# Patient Record
Sex: FEMALE | Race: BLACK OR AFRICAN AMERICAN | NOT HISPANIC OR LATINO | Employment: FULL TIME | URBAN - METROPOLITAN AREA
[De-identification: names, ages, dates, MRNs, and addresses within clinical notes are randomized per-mention and may not be internally consistent; named-entity substitution may affect disease eponyms.]

---

## 2018-11-27 ENCOUNTER — HOSPITAL ENCOUNTER (EMERGENCY)
Facility: CLINIC | Age: 38
Discharge: HOME | End: 2018-11-27
Attending: EMERGENCY MEDICINE | Admitting: EMERGENCY MEDICINE
Payer: COMMERCIAL

## 2018-11-27 VITALS
HEIGHT: 62 IN | DIASTOLIC BLOOD PRESSURE: 70 MMHG | BODY MASS INDEX: 34.96 KG/M2 | OXYGEN SATURATION: 100 % | WEIGHT: 190 LBS | SYSTOLIC BLOOD PRESSURE: 120 MMHG | RESPIRATION RATE: 16 BRPM | TEMPERATURE: 98 F

## 2018-11-27 DIAGNOSIS — K29.00 ACUTE GASTRITIS WITHOUT HEMORRHAGE, UNSPECIFIED GASTRITIS TYPE: ICD-10-CM

## 2018-11-27 DIAGNOSIS — R10.13 ABDOMINAL PAIN, EPIGASTRIC: ICD-10-CM

## 2018-11-27 LAB
ALBUMIN SERPL-MCNC: 3.5 G/DL (ref 3.4–5)
ALP SERPL-CCNC: 50 U/L (ref 40–150)
ALT SERPL W P-5'-P-CCNC: 17 U/L (ref 0–50)
ANION GAP SERPL CALCULATED.3IONS-SCNC: 6 MMOL/L (ref 3–14)
AST SERPL W P-5'-P-CCNC: 11 U/L (ref 0–45)
BASOPHILS # BLD AUTO: 0 10E9/L (ref 0–0.2)
BASOPHILS NFR BLD AUTO: 0.5 %
BILIRUB SERPL-MCNC: 0.6 MG/DL (ref 0.2–1.3)
BUN SERPL-MCNC: 20 MG/DL (ref 7–30)
CALCIUM SERPL-MCNC: 8.2 MG/DL (ref 8.5–10.1)
CHLORIDE SERPL-SCNC: 107 MMOL/L (ref 94–109)
CO2 SERPL-SCNC: 26 MMOL/L (ref 20–32)
CREAT SERPL-MCNC: 0.81 MG/DL (ref 0.52–1.04)
DIFFERENTIAL METHOD BLD: NORMAL
EOSINOPHIL # BLD AUTO: 0.4 10E9/L (ref 0–0.7)
EOSINOPHIL NFR BLD AUTO: 6.5 %
ERYTHROCYTE [DISTWIDTH] IN BLOOD BY AUTOMATED COUNT: 12.8 % (ref 10–15)
GFR SERPL CREATININE-BSD FRML MDRD: 79 ML/MIN/1.7M2
GLUCOSE SERPL-MCNC: 87 MG/DL (ref 70–99)
HCG UR QL: NEGATIVE
HCT VFR BLD AUTO: 38.2 % (ref 35–47)
HGB BLD-MCNC: 12.8 G/DL (ref 11.7–15.7)
IMM GRANULOCYTES # BLD: 0 10E9/L (ref 0–0.4)
IMM GRANULOCYTES NFR BLD: 0.2 %
LIPASE SERPL-CCNC: 88 U/L (ref 73–393)
LYMPHOCYTES # BLD AUTO: 2 10E9/L (ref 0.8–5.3)
LYMPHOCYTES NFR BLD AUTO: 31.5 %
MCH RBC QN AUTO: 31.4 PG (ref 26.5–33)
MCHC RBC AUTO-ENTMCNC: 33.5 G/DL (ref 31.5–36.5)
MCV RBC AUTO: 94 FL (ref 78–100)
MONOCYTES # BLD AUTO: 0.7 10E9/L (ref 0–1.3)
MONOCYTES NFR BLD AUTO: 10.9 %
NEUTROPHILS # BLD AUTO: 3.2 10E9/L (ref 1.6–8.3)
NEUTROPHILS NFR BLD AUTO: 50.4 %
NRBC # BLD AUTO: 0 10*3/UL
NRBC BLD AUTO-RTO: 0 /100
PLATELET # BLD AUTO: 226 10E9/L (ref 150–450)
POTASSIUM SERPL-SCNC: 3.6 MMOL/L (ref 3.4–5.3)
PROT SERPL-MCNC: 7.4 G/DL (ref 6.8–8.8)
RBC # BLD AUTO: 4.07 10E12/L (ref 3.8–5.2)
SODIUM SERPL-SCNC: 139 MMOL/L (ref 133–144)
WBC # BLD AUTO: 6.3 10E9/L (ref 4–11)

## 2018-11-27 PROCEDURE — 25000125 ZZHC RX 250: Performed by: EMERGENCY MEDICINE

## 2018-11-27 PROCEDURE — 80053 COMPREHEN METABOLIC PANEL: CPT | Performed by: EMERGENCY MEDICINE

## 2018-11-27 PROCEDURE — 85025 COMPLETE CBC W/AUTO DIFF WBC: CPT | Performed by: EMERGENCY MEDICINE

## 2018-11-27 PROCEDURE — 81025 URINE PREGNANCY TEST: CPT | Performed by: EMERGENCY MEDICINE

## 2018-11-27 PROCEDURE — 83690 ASSAY OF LIPASE: CPT | Performed by: EMERGENCY MEDICINE

## 2018-11-27 PROCEDURE — 99283 EMERGENCY DEPT VISIT LOW MDM: CPT

## 2018-11-27 PROCEDURE — 25000128 H RX IP 250 OP 636: Performed by: EMERGENCY MEDICINE

## 2018-11-27 PROCEDURE — 25000132 ZZH RX MED GY IP 250 OP 250 PS 637: Performed by: EMERGENCY MEDICINE

## 2018-11-27 RX ORDER — FAMOTIDINE 40 MG/1
40 TABLET, FILM COATED ORAL AT BEDTIME
Qty: 30 TABLET | Refills: 1 | Status: SHIPPED | OUTPATIENT
Start: 2018-11-27

## 2018-11-27 RX ADMIN — SODIUM CHLORIDE 1000 ML: 9 INJECTION, SOLUTION INTRAVENOUS at 06:25

## 2018-11-27 RX ADMIN — LIDOCAINE HYDROCHLORIDE 30 ML: 20 SOLUTION ORAL; TOPICAL at 06:25

## 2018-11-27 ASSESSMENT — ENCOUNTER SYMPTOMS
LIGHT-HEADEDNESS: 1
HEMATURIA: 0
NAUSEA: 1
VOMITING: 0
ABDOMINAL PAIN: 1

## 2018-11-27 NOTE — ED AVS SNAPSHOT
Emergency Department    [address] [place] [address]    Phone:  [phone]    Fax:  [phone]                                       Radha Loya   MRN: [id number]    Department:   Emergency Department   Date of Visit:  11/27/2018           After Visit Summary Signature Page     I have received my discharge instructions, and my questions have been answered. I have discussed any challenges I see with this plan with the nurse or doctor.    ..........................................................................................................................................  Patient/Patient Representative Signature      ..........................................................................................................................................  Patient Representative Print Name and Relationship to Patient    ..................................................               ................................................  Date                                   Time    ..........................................................................................................................................  Reviewed by Signature/Title    ...................................................              ..............................................  Date                                               Time          22EPIC Rev 08/18

## 2018-11-27 NOTE — ED AVS SNAPSHOT
Emergency Department    [address] [facility] [address]    Phone:  [phone]    Fax:  [phone]                                       Radha Loya   MRN: [id number]    Department:   Emergency Department   Date of Visit:  11/27/2018           Patient Information     Date Of Birth          1980        Your diagnoses for this visit were:     Abdominal pain, epigastric     Acute gastritis without hemorrhage, unspecified gastritis type        You were seen by Rigo Green MD.      Follow-up Information     Follow up with Clinic, [facility]. Schedule an appointment as soon as possible for a visit in 3 days.    Why:  For repeat evaluation and symptom check    Contact information:    [address] [facility] [address] [phone]          Follow up with  Emergency Department.    Specialty:  EMERGENCY MEDICINE    Why:  If symptoms worsen    Contact information:    [address] [facility] [address] [phone]      Discharge References/Attachments     GASTRITIS OR ULCER (NO ANTIBIOTIC TREATMENT) (ENGLISH)    EPIGASTRIC PAIN (UNCERTAIN CAUSE) (ENGLISH)      24 Hour Appointment Hotline       To make an appointment at any [facility], call [phone] ([phone]). If you don't have a family doctor or clinic, we will help you find one. [facility] clinics are conveniently located to serve the needs of you and your family.             Review of your medicines      START taking        Dose / Directions Last dose taken    famotidine 40 MG tablet   Commonly known as:  PEPCID   Dose:  40 mg   Quantity:  30 tablet        Take 1 tablet (40 mg) by mouth At Bedtime   Refills:  1          Our records show that you are taking the medicines listed below. If these are incorrect, please call your family doctor or clinic.        Dose / Directions Last dose taken    DOXY CAPS 100 MG OR   Quantity:  58        1tab daily  starting 1-2 days before travel ta [facility] area, each day while there  and up to 4 weeks [facility].   Refills:  0        SEPTRA -160 MG per tablet   Quantity:  30   Generic drug:  sulfamethoxazole-trimethoprim        1 tab PO BID prn travelers diarrhea   Refills:  0                Prescriptions were sent or printed at these locations (1 Prescription)                   Other Prescriptions                Printed at Department/Unit printer (1 of 1)         famotidine (PEPCID) 40 MG tablet                Procedures and tests performed during your visit     CBC with platelets differential    Comprehensive metabolic panel    HCG qualitative urine    Lipase      Orders Needing Specimen Collection     None      Pending Results     No orders found from 11/25/2018 to 11/28/2018.            Pending Culture Results     No orders found from 11/25/2018 to 11/28/2018.            Pending Results Instructions     If you had any lab results that were not finalized at the time of your Discharge, you can call the ED Lab Result RN at [phone]. You will be contacted by this team for any positive Lab results or changes in treatment. The nurses are available 7 days a week from 10A to 6:30P.  You can leave a message 24 hours per day and they will return your call.        Test Results From Your Hospital Stay        11/27/2018  6:24 AM      Component Results     Component Value Ref Range & Units Status    WBC 6.3 4.0 - 11.0 10e9/L Final    RBC Count 4.07 3.8 - 5.2 10e12/L Final    Hemoglobin 12.8 11.7 - 15.7 g/dL Final    Hematocrit 38.2 35.0 - 47.0 % Final    MCV 94 78 - 100 fl Final    MCH 31.4 26.5 - 33.0 pg Final    MCHC 33.5 31.5 - 36.5 g/dL Final    RDW 12.8 10.0 - 15.0 % Final    Platelet Count 226 150 - 450 10e9/L Final    Diff Method Automated Method  Final    % Neutrophils 50.4 % Final    % Lymphocytes 31.5 % Final    % Monocytes 10.9 % Final    % Eosinophils 6.5 % Final    % Basophils 0.5 % Final     % Immature Granulocytes 0.2 % Final    Nucleated RBCs 0 0 /100 Final    Absolute Neutrophil 3.2 1.6 - 8.3 10e9/L Final    Absolute Lymphocytes 2.0 0.8 - 5.3 10e9/L Final    Absolute Monocytes 0.7 0.0 - 1.3 10e9/L Final    Absolute Eosinophils 0.4 0.0 - 0.7 10e9/L Final    Absolute Basophils 0.0 0.0 - 0.2 10e9/L Final    Abs Immature Granulocytes 0.0 0 - 0.4 10e9/L Final    Absolute Nucleated RBC 0.0  Final         11/27/2018  6:40 AM      Component Results     Component Value Ref Range & Units Status    Sodium 139 133 - 144 mmol/L Final    Potassium 3.6 3.4 - 5.3 mmol/L Final    Chloride 107 94 - 109 mmol/L Final    Carbon Dioxide 26 20 - 32 mmol/L Final    Anion Gap 6 3 - 14 mmol/L Final    Glucose 87 70 - 99 mg/dL Final    Urea Nitrogen 20 7 - 30 mg/dL Final    Creatinine 0.81 0.52 - 1.04 mg/dL Final    GFR Estimate 79 >60 mL/min/1.7m2 Final    Non  GFR Calc    GFR Estimate If Black >90 >60 mL/min/1.7m2 Final    African American GFR Calc    Calcium 8.2 (L) 8.5 - 10.1 mg/dL Final    Bilirubin Total 0.6 0.2 - 1.3 mg/dL Final    Albumin 3.5 3.4 - 5.0 g/dL Final    Protein Total 7.4 6.8 - 8.8 g/dL Final    Alkaline Phosphatase 50 40 - 150 U/L Final    ALT 17 0 - 50 U/L Final    AST 11 0 - 45 U/L Final         11/27/2018  6:38 AM      Component Results     Component Value Ref Range & Units Status    Lipase 88 73 - 393 U/L Final         11/27/2018  7:10 AM      Component Results     Component Value Ref Range & Units Status    HCG Qual Urine Negative NEG^Negative Final    This test is for screening purposes.  Results should be interpreted along with   the clinical picture.  Confirmation testing is available if warranted by   ordering SFX855, HCG Quantitative Pregnancy.                  Clinical Quality Measure: Blood Pressure Screening     Your blood pressure was checked while you were in the emergency department today. The last reading we obtained was  BP: 119/75 . Please read the guidelines below about  "what these numbers mean and what you should do about them.  If your systolic blood pressure (the top number) is less than 120 and your diastolic blood pressure (the bottom number) is less than 80, then your blood pressure is normal. There is nothing more that you need to do about it.  If your systolic blood pressure (the top number) is 120-139 or your diastolic blood pressure (the bottom number) is 80-89, your blood pressure may be higher than it should be. You should have your blood pressure rechecked within a year by a primary care provider.  If your systolic blood pressure (the top number) is 140 or greater or your diastolic blood pressure (the bottom number) is 90 or greater, you may have high blood pressure. High blood pressure is treatable, but if left untreated over time it can put you at risk for heart attack, stroke, or kidney failure. You should have your blood pressure rechecked by a primary care provider within the next 4 weeks.  If your provider in the emergency department today gave you specific instructions to follow-up with your doctor or provider even sooner than that, you should follow that instruction and not wait for up to 4 weeks for your follow-up visit.        Thank you for choosing [facility]       Thank you for choosing [facility] for your care. Our goal is always to provide you with excellent care. Hearing back from our patients is one way we can continue to improve our services. Please take a few minutes to complete the written survey that you may receive in the mail after you visit with us. Thank you!        [facility] Information     [facility] lets you send messages to your doctor, view your test results, renew your prescriptions, schedule appointments and more. To sign up, go to [email or web address] . Click on \"Log in\" on the left side of the screen, which will take you to the Welcome page. Then click on \"Sign up Now\" on the right side of the page.     You will be asked to enter the access " code listed below, as well as some personal information. Please follow the directions to create your username and password.     Your access code is: [email or web address]  Expires: 2019  7:18 AM     Your access code will  in 90 days. If you need help or a new code, please call your [facility] clinic or [phone].        Care EveryWhere ID     This is your Care EveryWhere ID. This could be used by other organizations to access your [facility] medical records  [id number]        Equal Access to Services     [facility]: Elizabeth Aparicio, geovany gray, nasra reddyaldariusz fitch, vladimir bowles . So [facility] [phone].    ATENCIÓN: Si habla español, tiene a cordoba disposición servicios gratuitos de asistencia lingüística. Llame al [phone].    We comply with applicable federal civil rights laws and [place] laws. We do not discriminate on the basis of race, color, national origin, age, disability, sex, sexual orientation, or gender identity.            After Visit Summary       This is your record. Keep this with you and show to your community pharmacist(s) and doctor(s) at your next visit.

## 2018-11-27 NOTE — ED PROVIDER NOTES
"  History     Chief Complaint:    Abdominal Pain      HPI   Radha Loya is a 38 year old female who presents to the ED for evaluation of abdominal pain. The patient states that she developed sharp, generalized abdominal pain, worse on her left side, last night around 1900 in conjunction with nausea and lightheadedness. She states that she has had similar symptoms before concerning for possible ulcers or kidney stones but the etiology of her symptoms were never identified. She otherwise denies any vomit, hematuria, pain with bowel movements, vaginal bleeding, or vaginal discharge.      Allergies:  Chloroquine     Medications:    The patient is currently on no regular medications.     Past Medical History:    Headaches    Past Surgical History:    C/S  D&C    Family History:    No past pertinent family history.    Social History:  Negative for tobacco use.  Negative for alcohol use.  Marital Status:   [2]       Review of Systems   Gastrointestinal: Positive for abdominal pain and nausea. Negative for vomiting.   Genitourinary: Negative for hematuria, vaginal bleeding and vaginal discharge.   Neurological: Positive for light-headedness.   All other systems reviewed and are negative.      Physical Exam   First Vitals:  BP: 119/75  Heart Rate: 72  Temp: 98  F (36.7  C)  Resp: 18  Height: 157.5 cm (5' 2\")  Weight: 86.2 kg (190 lb)  SpO2: 100 %      Physical Exam   Vitals: reviewed by me  General: Pt seen on [facility], [facility], cooperative, and alert to conversation  Eyes: Tracking well, clear conjunctiva BL  ENT: MMM, midline trachea.   Lungs:  No tachypnea, no accessory muscle use. No respiratory distress.   CV: Rate as above, regular rhythm.    Abd: Soft, minimal tenderness to the epigastrium only, no guarding, no rebound, no pain to McBurney site, negative Vogt sign.  MSK: no peripheral edema or joint effusion.  No evidence of trauma  Skin: No rash, normal turgor and temperature  Neuro: Clear speech and " no facial droop.  Psych: Not RIS, no e/o AH/VH      Emergency Department Course       Laboratory:  CBC: WBC: 6.3, HGB: 12.8, PLT: 226  CMP: Glucose 87, Calcium 8.2 (L), o/w WNL (Creatinine: 0.81)    Lipase: 88    Interventions:  0625 NS 1,000 mLs IV   GI cocktail 30 mLs PO  Please see MAR for full list of medications administered in the ED.      Emergency Department Course:  Nursing notes and vitals reviewed. (7073) I performed an exam of the patient as documented above.     IV inserted. Medicine administered as documented above. Blood drawn. This was sent to the lab for further testing, results above.       I personally reviewed the laboratory results with the Patient and answered all related questions prior to discharge.     Impression & Plan      Medical Decision Making:  A very pleasant 38-year-old female who presents the emergency room with appears to be gastritis versus undifferentiated epigastric pain.  There is a postprandial element, and she feels much improved after a GI cocktail.  Her abdomen is benign at time of arrival and at time of discharge.  Her labs are within normal limits with no evidence of systemic illness, and her vital signs are reassuring as well.  Patient tolerated orals here, I see no evidence of emergent abdominal malady, she has no cardia pulmonary symptoms, I do feel comfortable having her follow in the outpatient setting.  Patient is okay with this plan, knows red flags when to come back.  No vaginal complaints, no lower abdominal pain, otherwise doing well.    Diagnosis:    ICD-10-CM    1. Abdominal pain, epigastric R10.13    2. Acute gastritis without hemorrhage, unspecified gastritis type K29.00        Disposition:  Discharged home     Discharge Medications:  Discharge Medication List as of 11/27/2018  7:18 AM      START taking these medications    Details   famotidine (PEPCID) 40 MG tablet Take 1 tablet (40 mg) by mouth At Bedtime, Disp-30 tablet, R-1, Local Print           Scribe  Disclosure:  I,  Alfonso Gonzales, am serving as a scribe on 11/27/2018 at 5:34 AM to personally document services performed by Riog Green* based on my observations and the provider's statements to me.          Alfonso Gonzales  11/27/2018    EMERGENCY DEPARTMENT       Rigo Green MD  11/28/18 3845

## 2020-02-06 ENCOUNTER — HOSPITAL ENCOUNTER (EMERGENCY)
Facility: CLINIC | Age: 40
Discharge: HOME | End: 2020-02-06
Attending: EMERGENCY MEDICINE | Admitting: EMERGENCY MEDICINE

## 2020-02-06 ENCOUNTER — APPOINTMENT (OUTPATIENT)
Dept: ULTRASOUND IMAGING | Facility: CLINIC | Age: 40
End: 2020-02-06
Attending: EMERGENCY MEDICINE

## 2020-02-06 VITALS
HEIGHT: 62 IN | WEIGHT: 190 LBS | HEART RATE: 56 BPM | TEMPERATURE: 97.7 F | BODY MASS INDEX: 34.96 KG/M2 | OXYGEN SATURATION: 100 % | RESPIRATION RATE: 16 BRPM | SYSTOLIC BLOOD PRESSURE: 113 MMHG | DIASTOLIC BLOOD PRESSURE: 69 MMHG

## 2020-02-06 DIAGNOSIS — M94.0 COSTOCHONDRITIS: ICD-10-CM

## 2020-02-06 LAB
ALBUMIN SERPL-MCNC: 3.7 G/DL (ref 3.4–5)
ALP SERPL-CCNC: 55 U/L (ref 40–150)
ALT SERPL W P-5'-P-CCNC: 21 U/L (ref 0–50)
ANION GAP SERPL CALCULATED.3IONS-SCNC: 3 MMOL/L (ref 3–14)
AST SERPL W P-5'-P-CCNC: 13 U/L (ref 0–45)
BASOPHILS # BLD AUTO: 0 10E9/L (ref 0–0.2)
BASOPHILS NFR BLD AUTO: 0.5 %
BILIRUB SERPL-MCNC: 0.5 MG/DL (ref 0.2–1.3)
BUN SERPL-MCNC: 20 MG/DL (ref 7–30)
CALCIUM SERPL-MCNC: 8.4 MG/DL (ref 8.5–10.1)
CHLORIDE SERPL-SCNC: 107 MMOL/L (ref 94–109)
CO2 SERPL-SCNC: 25 MMOL/L (ref 20–32)
CREAT SERPL-MCNC: 0.83 MG/DL (ref 0.52–1.04)
D DIMER PPP FEU-MCNC: <0.3 UG/ML FEU (ref 0–0.5)
DIFFERENTIAL METHOD BLD: NORMAL
EOSINOPHIL # BLD AUTO: 0.3 10E9/L (ref 0–0.7)
EOSINOPHIL NFR BLD AUTO: 5.2 %
ERYTHROCYTE [DISTWIDTH] IN BLOOD BY AUTOMATED COUNT: 13 % (ref 10–15)
GFR SERPL CREATININE-BSD FRML MDRD: 88 ML/MIN/{1.73_M2}
GLUCOSE SERPL-MCNC: 91 MG/DL (ref 70–99)
HCT VFR BLD AUTO: 40.8 % (ref 35–47)
HGB BLD-MCNC: 13.4 G/DL (ref 11.7–15.7)
IMM GRANULOCYTES # BLD: 0 10E9/L (ref 0–0.4)
IMM GRANULOCYTES NFR BLD: 0.2 %
INTERPRETATION ECG - MUSE: NORMAL
LYMPHOCYTES # BLD AUTO: 1.7 10E9/L (ref 0.8–5.3)
LYMPHOCYTES NFR BLD AUTO: 29.1 %
MCH RBC QN AUTO: 31.6 PG (ref 26.5–33)
MCHC RBC AUTO-ENTMCNC: 32.8 G/DL (ref 31.5–36.5)
MCV RBC AUTO: 96 FL (ref 78–100)
MONOCYTES # BLD AUTO: 0.4 10E9/L (ref 0–1.3)
MONOCYTES NFR BLD AUTO: 7.1 %
NEUTROPHILS # BLD AUTO: 3.4 10E9/L (ref 1.6–8.3)
NEUTROPHILS NFR BLD AUTO: 57.9 %
NRBC # BLD AUTO: 0 10*3/UL
NRBC BLD AUTO-RTO: 0 /100
PLATELET # BLD AUTO: 246 10E9/L (ref 150–450)
POTASSIUM SERPL-SCNC: 3.8 MMOL/L (ref 3.4–5.3)
PROT SERPL-MCNC: 7.8 G/DL (ref 6.8–8.8)
RBC # BLD AUTO: 4.24 10E12/L (ref 3.8–5.2)
SODIUM SERPL-SCNC: 135 MMOL/L (ref 133–144)
TROPONIN I SERPL-MCNC: <0.015 UG/L (ref 0–0.04)
WBC # BLD AUTO: 5.9 10E9/L (ref 4–11)

## 2020-02-06 PROCEDURE — 93971 EXTREMITY STUDY: CPT | Mod: LT

## 2020-02-06 PROCEDURE — 99285 EMERGENCY DEPT VISIT HI MDM: CPT | Mod: 25

## 2020-02-06 PROCEDURE — 84484 ASSAY OF TROPONIN QUANT: CPT | Performed by: EMERGENCY MEDICINE

## 2020-02-06 PROCEDURE — 85025 COMPLETE CBC W/AUTO DIFF WBC: CPT | Performed by: EMERGENCY MEDICINE

## 2020-02-06 PROCEDURE — 93005 ELECTROCARDIOGRAM TRACING: CPT

## 2020-02-06 PROCEDURE — 85379 FIBRIN DEGRADATION QUANT: CPT | Performed by: EMERGENCY MEDICINE

## 2020-02-06 PROCEDURE — 80053 COMPREHEN METABOLIC PANEL: CPT | Performed by: EMERGENCY MEDICINE

## 2020-02-06 RX ORDER — ACETAMINOPHEN 325 MG/1
325-650 TABLET ORAL EVERY 6 HOURS PRN
COMMUNITY

## 2020-02-06 RX ORDER — IBUPROFEN 600 MG/1
600 TABLET, FILM COATED ORAL EVERY 6 HOURS PRN
Qty: 30 TABLET | Refills: 0 | Status: SHIPPED | OUTPATIENT
Start: 2020-02-06

## 2020-02-06 ASSESSMENT — ENCOUNTER SYMPTOMS
COUGH: 0
MYALGIAS: 1
SHORTNESS OF BREATH: 0
HEADACHES: 1

## 2020-02-06 ASSESSMENT — MIFFLIN-ST. JEOR: SCORE: 1485.08

## 2020-02-06 NOTE — ED TRIAGE NOTES
midsternal chest pain since coming to work last night - sharp feeling - denies any other symptoms

## 2020-02-06 NOTE — ED AVS SNAPSHOT
Emergency Department  [address] [facility] [address]  Phone:  [phone]  Fax:  [phone]                                    Radha Loya   MRN: [id number]    Department:   Emergency Department   Date of Visit:  2/6/2020           After Visit Summary Signature Page    I have received my discharge instructions, and my questions have been answered. I have discussed any challenges I see with this plan with the nurse or doctor.    ..........................................................................................................................................  Patient/Patient Representative Signature      ..........................................................................................................................................  Patient Representative Print Name and Relationship to Patient    ..................................................               ................................................  Date                                   Time    ..........................................................................................................................................  Reviewed by Signature/Title    ...................................................              ..............................................  Date                                               Time          22EPIC Rev 08/18

## 2020-02-06 NOTE — ED PROVIDER NOTES
"  History     Chief Complaint:  Chest Pain      HPI   Radha Loya is a 39 year old female who presents with chest pain. The patient reports mid sternal chest pain that began at work yesterday. The pain has not improved since yestaerday and it is exacerbated by movement. She states that she moves frequently at work but denies any recent fall, injury, or increase in exercise. She endorses a headache as well. The patient also notes a \"strange feeling\" in her left calf that began a few days ago. The patient denies difficulty breathing but states that over the past few months she has had more shortness of breath when walking up stairs. She denies cough, swelling in her legs, and shortness of breath.     Allergies:  Chloroquine    Medications:    Pepcid   Septra    Past Medical History:    History reviewed. No pertinent past medical history.    Past Surgical History:    GYN surgery     Family History:    History reviewed. No pertinent family history.    Social History:  Smoking Status: Never Smoker  Smokeless Tobacco: Never Used  Alcohol Use: No  Drug Use: No  PCP: [facility], [facility]  Marital Status:      Review of Systems   Respiratory: Negative for cough and shortness of breath.    Cardiovascular: Positive for chest pain. Negative for leg swelling.   Musculoskeletal: Positive for myalgias (positive leg pain).   Neurological: Positive for headaches.    10 point review of systems performed and is negative except as above and in HPI.      Physical Exam     Patient Vitals for the past 24 hrs:   BP Temp Temp src Pulse Resp SpO2 Height Weight   02/06/20 0724 135/79 97.7  F (36.5  C) Oral 73 18 99 % 1.575 m (5' 2\") 86.2 kg (190 lb)       Physical Exam    General: Resting on the gurney, appears uncomfortable  Head:  The scalp, face, and head appear normal  Mouth/Throat: Mucus membranes are moist  CV:  Regular rate    Normal S1 and S2  No pathological murmur   Resp:  Breath sounds clear and equal " bilaterally    Non-labored, no retractions or accessory muscle use    No coarseness    No wheezing   GI:  Abdomen is soft, no rigidity    No tenderness to palpation  MS:  Normal motor assessment of all extremities.    Good capillary refill noted.    Tenderness to palpation of the left costochondral joint.    Minimal right lower extremity tenderness to palpation. No edema.   Skin:  No rash or lesions noted.  Neuro:  Speech is normal and fluent. No apparent deficit.  Psych: Awake. Alert.  Normal affect.      Appropriate interactions.    Emergency Department Course   ECG:  ECG taken at 0727, ECG read at 0908  Normal sinus rhythm  Left axis deviation  Abnormal ECG  Rate 69 bpm. LA interval 158 ms. QRS duration 84 ms. QT/QTc 420/450 ms. P-R-T axes 47 -34 23.    Imaging:  Radiology findings were communicated with the patient who voiced understanding of the findings.    US Lower Extremity Venous Duplex Left  No evidence for deep venous thrombosis in the left lower  extremity veins.  Reading per radiology    Laboratory:  Laboratory findings were communicated with the patient who voiced understanding of the findings.    CBC: WBC 5.9, HGB 13.4,   CMP: calcium 8.4 (L) o/w WNL (Creatinine 0.83)  Troponin (Collected 0813): <0.015  D-dimer: <0.3    Emergency Department Course:  Nursing notes and vitals reviewed.    0754 I performed an exam of the patient as documented above.     The patient was sent for a US lower extremity venous duplex left while in the emergency department, results above.     IV was inserted and blood was drawn for laboratory testing, results above.    0909 I returned to update the patient about their plan for discharge.     Findings and plan explained to the Patient. Patient discharged home with instructions regarding supportive care, medications, and reasons to return. The importance of close follow-up was reviewed. The patient was prescribed ibuprofen.     Impression & Plan      Medical Decision  Making:  Radha Loya is a 39 year old female presents with complaint of chest wall/rib pain.  There has been no trauma.  Pain is exactly reproducible with palpation.  US was obtained and showed no acute process. This does not appear to be ACS or PE as her symptom complex is not compatible with either.  She is advised to take Ibuprofen and use ice or heat to relieve pain.  She will follow up with PCP in 3-5 days if no improvement or sooner if worsening.  she will return to the ED if she develops difficulty breathing, high fever(not controlled with medications), central chest pain or for other concerns.    Diagnosis:    ICD-10-CM    1. Costochondritis M94.0        Disposition:   The patient is discharged to home.    Discharge Medications:  New Prescriptions    IBUPROFEN (ADVIL/MOTRIN) 600 MG TABLET    Take 1 tablet (600 mg) by mouth every 6 hours as needed for moderate pain       Scribe Disclosure:  Nicolle LEE, am serving as a scribe at 7:38 AM on 2/6/2020 to document services personally performed by Katharina Reynoso MD based on my observations and the provider's statements to me.      EMERGENCY DEPARTMENT       Katharina Reynoso MD  02/07/20 2747

## 2020-04-27 ENCOUNTER — RESULTS ONLY (OUTPATIENT)
Dept: LAB | Age: 40
End: 2020-04-27

## 2020-04-27 ENCOUNTER — OFFICE VISIT (OUTPATIENT)
Dept: URGENT CARE | Facility: URGENT CARE | Age: 40
End: 2020-04-27

## 2020-04-27 DIAGNOSIS — Z53.9 ERRONEOUS ENCOUNTER--DISREGARD: Primary | ICD-10-CM

## 2020-04-27 LAB
SARS-COV-2 RNA SPEC QL NAA+PROBE: NOT DETECTED
SPECIMEN SOURCE: NORMAL

## 2020-04-27 NOTE — PATIENT INSTRUCTIONS
[facility] Employee Health team will receive your Covid 19 test results in the next 1-4 days.  Once your results are received, Employee Health will call you with your test result and discuss your Return to Work timeline and criteria.

## 2020-05-06 ENCOUNTER — RESULTS ONLY (OUTPATIENT)
Dept: LAB | Age: 40
End: 2020-05-06

## 2020-05-06 ENCOUNTER — APPOINTMENT (OUTPATIENT)
Dept: URGENT CARE | Facility: URGENT CARE | Age: 40
End: 2020-05-06

## 2020-05-07 LAB
SARS-COV-2 RNA SPEC QL NAA+PROBE: NORMAL
SPECIMEN SOURCE: NORMAL

## 2020-05-10 LAB
COVID-19 VIRUS PCR TO MAYO - RESULT: NORMAL
SPECIMEN SOURCE: NORMAL

## 2020-08-06 ENCOUNTER — APPOINTMENT (OUTPATIENT)
Dept: FAMILY MEDICINE | Facility: CLINIC | Age: 40
End: 2020-08-06

## 2020-08-06 ENCOUNTER — RESULTS ONLY (OUTPATIENT)
Dept: LAB | Age: 40
End: 2020-08-06

## 2020-08-07 LAB
SARS-COV-2 RNA SPEC QL NAA+PROBE: NOT DETECTED
SPECIMEN SOURCE: NORMAL

## 2020-11-29 ENCOUNTER — HEALTH MAINTENANCE LETTER (OUTPATIENT)
Age: 40
End: 2020-11-29

## 2021-03-28 ENCOUNTER — APPOINTMENT (OUTPATIENT)
Dept: GENERAL RADIOLOGY | Facility: CLINIC | Age: 41
End: 2021-03-28
Attending: EMERGENCY MEDICINE

## 2021-03-28 ENCOUNTER — HOSPITAL ENCOUNTER (EMERGENCY)
Facility: CLINIC | Age: 41
Discharge: HOME | End: 2021-03-28
Attending: EMERGENCY MEDICINE | Admitting: EMERGENCY MEDICINE

## 2021-03-28 VITALS
HEART RATE: 69 BPM | RESPIRATION RATE: 18 BRPM | WEIGHT: 200 LBS | SYSTOLIC BLOOD PRESSURE: 130 MMHG | HEIGHT: 62 IN | DIASTOLIC BLOOD PRESSURE: 83 MMHG | OXYGEN SATURATION: 97 % | BODY MASS INDEX: 36.8 KG/M2

## 2021-03-28 DIAGNOSIS — S83.92XA SPRAIN OF LEFT KNEE, UNSPECIFIED LIGAMENT, INITIAL ENCOUNTER: ICD-10-CM

## 2021-03-28 PROCEDURE — 73562 X-RAY EXAM OF KNEE 3: CPT | Mod: LT

## 2021-03-28 PROCEDURE — 99283 EMERGENCY DEPT VISIT LOW MDM: CPT

## 2021-03-28 RX ORDER — IBUPROFEN 600 MG/1
600 TABLET, FILM COATED ORAL ONCE
Status: DISCONTINUED | OUTPATIENT
Start: 2021-03-28 | End: 2021-03-28 | Stop reason: HOSPADM

## 2021-03-28 ASSESSMENT — MIFFLIN-ST. JEOR: SCORE: 1525.44

## 2021-03-28 ASSESSMENT — ENCOUNTER SYMPTOMS: ARTHRALGIAS: 1

## 2021-03-28 NOTE — LETTER
March 28, 2021      To Whom It May Concern:      Radha Loya was seen in our Emergency Department today, 03/28/21.  I expect her condition to improve over the next 2 days.  She may return to work/school when improved.    Sincerely,        Emilee Glasgow RN

## 2021-03-28 NOTE — ED PROVIDER NOTES
"  History   Chief Complaint:  Knee Injury       The history is provided by the patient.      Radha Loya is an otherwise healthy 40 year old female who presents for evaluation of left knee pain. The patient was working as a nursing assistant, lifting a patient and twisting around a few hours ago and felt immediate left knee pain. Pain is mostly around the knee cap. She is able to ambulate with significant pain. No history of knee issues.     Review of Systems   Musculoskeletal: Positive for arthralgias (left knee).   All other systems reviewed and are negative.    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  Musculoskeletal Left Leg: Able to lift left leg off the bed. Diffuse tenderness infrapatellar and medial knee. No swelling. No effusion. No ligamentous laxity. Negative drawer sign. Normal CMS distally.   Neurological: Awake, alert, appropriate.     Emergency Department Course     Imaging:  XR Knee Left 3 views:  Tiny medial compartment and patellofemoral compartment   osteophytes. No evidence of fracture, effusion or calcified   intra-articular body.  Per radiology.     Emergency Department Course:    Reviewed:  I reviewed nursing notes, vitals, past medical history and care everywhere    Assessments:  0802 I obtained history and examined the patient as noted above.   0900 I rechecked the patient and explained findings.     Disposition:  The patient was discharged to home.     Impression & Plan     Medical Decision Making:  Radha " Shalini is a 40 year old female who works as a nursing assistant on the fifth floor and as she was lifting a patient, she felt pain in her left knee. She is able to hobble but she is uncomfortable. On examination of the knee there was no effusion or ligamentous laxity, but she does have diffuse pain anteriorly and medially. The pain however seems out of proportion. X-ray reveals no acute fracture. Patient has been given Advil, placed in an ace wrap, and is able to ambulate. We will give her some crutches as well. She has requested two days off work. She should use cold, Advil, elevation. I have referred her to orthopedics, Dr. Rojas, and she can follow up with her primary as well.       Covid-19  Radha Loya was evaluated during a global COVID-19 pandemic, which necessitated consideration that the patient might be at risk for infection with the SARS-CoV-2 virus that causes COVID-19.   Applicable protocols for evaluation were followed during the patient's care.   COVID-19 was considered as part of the patient's evaluation. The plan for testing is:  the patient was referred for outpatient testing.    Diagnosis:    ICD-10-CM    1. Sprain of left knee, unspecified ligament, initial encounter  S83.92XA        Scribe Disclosure:  JESUS, Zahra Stoll, am serving as a scribe at 8:02 AM on 3/28/2021 to document services personally performed by Jass Wharton MD based on my observations and the provider's statements to me.        Jass Wharton MD  03/28/21 5251

## 2021-04-05 ENCOUNTER — OFFICE VISIT (OUTPATIENT)
Dept: URGENT CARE | Facility: URGENT CARE | Age: 41
End: 2021-04-05

## 2021-04-05 VITALS
DIASTOLIC BLOOD PRESSURE: 78 MMHG | TEMPERATURE: 98.7 F | BODY MASS INDEX: 36.73 KG/M2 | SYSTOLIC BLOOD PRESSURE: 116 MMHG | RESPIRATION RATE: 16 BRPM | HEART RATE: 68 BPM | OXYGEN SATURATION: 99 % | WEIGHT: 200.8 LBS

## 2021-04-05 DIAGNOSIS — M25.562 ACUTE PAIN OF LEFT KNEE: Primary | ICD-10-CM

## 2021-04-05 PROCEDURE — 99213 OFFICE O/P EST LOW 20 MIN: CPT | Performed by: PHYSICIAN ASSISTANT

## 2021-04-05 ASSESSMENT — PAIN SCALES - GENERAL: PAINLEVEL: MILD PAIN (2)

## 2021-04-06 NOTE — PROGRESS NOTES
Assessment & Plan     Encounter Diagnosis   Name Primary?     Acute pain of left knee Yes     Knee pain is sigificantly improved, she is able to return to work today with restriction of avoiding squatting activity which still remains somewhat uncomfortable for her.  I discussed ways she could do her work duties by using a stool to sit on rather than squatting down.  She may follow-up with pcp for persistent or worsening sx.  Pt agreeable with plan.       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  xrays were negative. She had no effusion at that time.  She was treated symptomatically and given time off work to rest. She notes sx are significantly improved and feels ready to return to work.         Objective    /78   Pulse 68   Temp 98.7  F (37.1  C) (Tympanic)   Resp 16   Wt 91.1 kg (200 lb 12.8 oz)   SpO2 99%   BMI 36.73 kg/m    Physical Exam   Pt ambulates well.  Able to rise up and off exam table.    Exam of the L knee reveals no effusion, redness, ecchymosis. No TTP of any of the bony processes.    No joint line pain.    Full AROM but mild pain with terminal flexion localized to the patellofemoral area/anterior knee.    Stress tests negative for laxity.    Otilia's test negative.

## 2021-04-21 ENCOUNTER — RESULTS ONLY (OUTPATIENT)
Dept: LAB | Age: 41
End: 2021-04-21

## 2021-04-21 ENCOUNTER — VIRTUAL VISIT (OUTPATIENT)
Dept: FAMILY MEDICINE | Facility: CLINIC | Age: 41
End: 2021-04-21
Payer: COMMERCIAL

## 2021-04-21 ENCOUNTER — OFFICE VISIT (OUTPATIENT)
Dept: URGENT CARE | Facility: URGENT CARE | Age: 41
End: 2021-04-21
Payer: COMMERCIAL

## 2021-04-21 DIAGNOSIS — Z20.822 SUSPECTED 2019 NOVEL CORONAVIRUS INFECTION: Primary | ICD-10-CM

## 2021-04-21 DIAGNOSIS — M25.562 ACUTE PAIN OF LEFT KNEE: Primary | ICD-10-CM

## 2021-04-21 LAB
LABORATORY COMMENT REPORT: NORMAL
SARS-COV-2 RNA RESP QL NAA+PROBE: NEGATIVE
SARS-COV-2 RNA RESP QL NAA+PROBE: NORMAL
SPECIMEN SOURCE: NORMAL
SPECIMEN SOURCE: NORMAL

## 2021-04-21 PROCEDURE — 99207 PR NO BILLABLE SERVICE THIS VISIT: CPT | Performed by: PHYSICIAN ASSISTANT

## 2021-04-21 NOTE — PATIENT INSTRUCTIONS
At [facility], we strive to deliver an exceptional experience to you, every time we see you. If you receive a survey, please complete it as we do value your feedback.  If you have MyChart, you can expect to receive results automatically within 24 hours of their completion.  Your provider will send a note interpreting your results as well.   If you do not have MyChart, you should receive your results in about a week by mail.    Your care team:                            Family Medicine Internal Medicine   MD Hasmukh Blanca MD Shantel Branch-Fleming, MD Srinivasa Vaka, MD Katya Belousova, PAKAYLENE Damian, APRN CNP    Jaxno Mills, MD Pediatrics   Kaushik Terry, PAKAYLENE Mcghee, CNP MD Char Grimes APRN CNP   MD Alejandra Mahajan MD Deborah Mielke, MD Sabrina Trevino, APRN [facility]      Clinic hours: Monday - Thursday 7 am-6 pm; Fridays 7 am-5 pm.   Urgent care: Monday - Friday 10 am- 8 pm; Saturday and Sunday 9 am-5 pm.    Clinic: [phone]       [facility]: Monday - Thursday 8 am - 7 pm; Friday 8 am - 6 pm  [facility]: [phone]     Use [email or web address] for 24/7 diagnosis and treatment of dozens of conditions.

## 2021-04-21 NOTE — PROGRESS NOTES
"Radha is a 40 year old who is being evaluated via a billable video visit.      How would you like to obtain your AVS? [employer]  If the video visit is dropped, the invitation should be resent by: Text to cell phone: [phone]  Will anyone else be joining your video visit? No    Phone Start Time: 9:44 AM, patient was unable to connect to a video call , so visit was switch to a phone     Assessment & Plan   Problem List Items Addressed This Visit     None      Visit Diagnoses     Acute pain of left knee    -  Primary         Left knee pain has resolved  Patient my return to work without restrictions starting today  Letter was created and patient may retrieve it from [employer]    12 minutes spent on the date of the encounter doing chart review, history and exam, documentation and further activities per the note       BMI:   Estimated body mass index is 36.73 kg/m  as calculated from the following:    Height as of 3/28/21: 1.575 m (5' 2\").    Weight as of 4/5/21: 91.1 kg (200 lb 12.8 oz).           Return as needed.    Sophie Lopez PA-C  [facility] YVON Garcia is a 40 year old who presents for the following health issues     HPI     Musculoskeletal problem/pain follow up - Discuss lifting work restrictions. Patient states she was told to no squat or lift anything heavy due to knee pain. However, knee pain has resolved, patient has been squating, lifting, walking, climbing stairs without any pain.    Onset/Duration: 3/28/2021  Description  Location: knee - left  Joint Swelling: no  Redness: no  Pain: no  Warmth: no  Intensity:  moderate  Progression of Symptoms:  same  Accompanying signs and symptoms:   Fevers: no  Numbness/tingling/weakness: no  History  Trauma to the area: YES  Recent illness:  no  Previous similar problem: no  Previous evaluation:  YES  Precipitating or alleviating factors:  Aggravating factors include: standing, walking and overuse  Therapies tried and " outcome: acetaminophen and Ibuprofen as needed        Review of Systems   Constitutional, HEENT, cardiovascular, pulmonary, gi and gu systems are negative, except as otherwise noted.      Objective           Vitals:  No vitals were obtained today due to virtual visit.    Physical Exam   No exam was done due to phone visit    Reviewed in Epic             Video-Visit Details    Type of service:  phone Visit    Video End Time:9:53 AM

## 2021-04-21 NOTE — LETTER
[address] [address]  Phone: [phone]    April 21, 2021        Radha Loya  [address]  [facility] [place] [address]          To whom it may concern:    RE: Radha Loya    Patient was seen and treated today at our clinic.  Patient may return to work 04/21/21 with the following:  No restrictions    Please contact me for questions or concerns.      Sincerely,      Sophie Lopez PA-C

## 2021-09-19 ENCOUNTER — HEALTH MAINTENANCE LETTER (OUTPATIENT)
Age: 41
End: 2021-09-19

## 2022-01-09 ENCOUNTER — HEALTH MAINTENANCE LETTER (OUTPATIENT)
Age: 42
End: 2022-01-09

## 2022-10-19 ENCOUNTER — LAB REQUISITION (OUTPATIENT)
Dept: LAB | Facility: CLINIC | Age: 42
End: 2022-10-19

## 2022-10-19 PROCEDURE — 86481 TB AG RESPONSE T-CELL SUSP: CPT | Performed by: INTERNAL MEDICINE

## 2022-10-21 LAB
GAMMA INTERFERON BACKGROUND BLD IA-ACNC: 0.03 IU/ML
M TB IFN-G BLD-IMP: POSITIVE
M TB IFN-G CD4+ BCKGRND COR BLD-ACNC: 9.97 IU/ML
MITOGEN IGNF BCKGRD COR BLD-ACNC: 0.45 IU/ML
MITOGEN IGNF BCKGRD COR BLD-ACNC: 0.69 IU/ML
QUANTIFERON MITOGEN: 10 IU/ML
QUANTIFERON NIL TUBE: 0.03 IU/ML
QUANTIFERON TB1 TUBE: 0.48 IU/ML
QUANTIFERON TB2 TUBE: 0.72

## 2022-11-21 ENCOUNTER — HEALTH MAINTENANCE LETTER (OUTPATIENT)
Age: 42
End: 2022-11-21

## 2023-02-12 ENCOUNTER — APPOINTMENT (OUTPATIENT)
Dept: MRI IMAGING | Facility: CLINIC | Age: 43
End: 2023-02-12
Attending: FAMILY MEDICINE
Payer: COMMERCIAL

## 2023-02-12 ENCOUNTER — APPOINTMENT (OUTPATIENT)
Dept: CT IMAGING | Facility: CLINIC | Age: 43
End: 2023-02-12
Attending: FAMILY MEDICINE
Payer: COMMERCIAL

## 2023-02-12 ENCOUNTER — HOSPITAL ENCOUNTER (OUTPATIENT)
Facility: CLINIC | Age: 43
Setting detail: OBSERVATION
Discharge: HOME | End: 2023-02-14
Attending: FAMILY MEDICINE | Admitting: PHYSICIAN ASSISTANT
Payer: COMMERCIAL

## 2023-02-12 ENCOUNTER — APPOINTMENT (OUTPATIENT)
Dept: GENERAL RADIOLOGY | Facility: CLINIC | Age: 43
End: 2023-02-12
Attending: FAMILY MEDICINE
Payer: COMMERCIAL

## 2023-02-12 DIAGNOSIS — G89.29 CHRONIC NONINTRACTABLE HEADACHE, UNSPECIFIED HEADACHE TYPE: ICD-10-CM

## 2023-02-12 DIAGNOSIS — Z20.822 LAB TEST NEGATIVE FOR COVID-19 VIRUS: ICD-10-CM

## 2023-02-12 DIAGNOSIS — R07.9 CHEST PAIN, UNSPECIFIED TYPE: ICD-10-CM

## 2023-02-12 DIAGNOSIS — H81.13 BENIGN PAROXYSMAL POSITIONAL VERTIGO, BILATERAL: ICD-10-CM

## 2023-02-12 DIAGNOSIS — R00.2 PALPITATIONS: ICD-10-CM

## 2023-02-12 DIAGNOSIS — R51.9 CHRONIC NONINTRACTABLE HEADACHE, UNSPECIFIED HEADACHE TYPE: ICD-10-CM

## 2023-02-12 LAB
ALBUMIN SERPL BCG-MCNC: 4.6 G/DL (ref 3.5–5.2)
ALBUMIN UR-MCNC: 10 MG/DL
ALP SERPL-CCNC: 57 U/L (ref 35–104)
ALT SERPL W P-5'-P-CCNC: 16 U/L (ref 10–35)
ANION GAP SERPL CALCULATED.3IONS-SCNC: 8 MMOL/L (ref 7–15)
APPEARANCE UR: CLEAR
APTT PPP: 28 SECONDS (ref 22–38)
AST SERPL W P-5'-P-CCNC: 20 U/L (ref 10–35)
ATRIAL RATE - MUSE: 60 BPM
BACTERIA #/AREA URNS HPF: ABNORMAL /HPF
BASOPHILS # BLD AUTO: 0.1 10E3/UL (ref 0–0.2)
BASOPHILS NFR BLD AUTO: 1 %
BILIRUB SERPL-MCNC: 0.7 MG/DL
BILIRUB UR QL STRIP: NEGATIVE
BUN SERPL-MCNC: 18.4 MG/DL (ref 6–20)
CALCIUM SERPL-MCNC: 9 MG/DL (ref 8.6–10)
CHLORIDE SERPL-SCNC: 98 MMOL/L (ref 98–107)
COLOR UR AUTO: ABNORMAL
CREAT SERPL-MCNC: 0.81 MG/DL (ref 0.51–0.95)
CREAT SERPL-MCNC: 0.89 MG/DL (ref 0.51–0.95)
CRP SERPL-MCNC: 9.1 MG/L
DEPRECATED HCO3 PLAS-SCNC: 24 MMOL/L (ref 22–29)
DIASTOLIC BLOOD PRESSURE - MUSE: NORMAL MMHG
EOSINOPHIL # BLD AUTO: 0.4 10E3/UL (ref 0–0.7)
EOSINOPHIL NFR BLD AUTO: 7 %
ERYTHROCYTE [DISTWIDTH] IN BLOOD BY AUTOMATED COUNT: 12.4 % (ref 10–15)
GFR SERPL CREATININE-BSD FRML MDRD: 82 ML/MIN/1.73M2
GFR SERPL CREATININE-BSD FRML MDRD: >90 ML/MIN/1.73M2
GLUCOSE SERPL-MCNC: 88 MG/DL (ref 70–99)
GLUCOSE UR STRIP-MCNC: NEGATIVE MG/DL
HCG SERPL QL: NEGATIVE
HCT VFR BLD AUTO: 40.2 % (ref 35–47)
HGB BLD-MCNC: 13.4 G/DL (ref 11.7–15.7)
HGB UR QL STRIP: NEGATIVE
HOLD SPECIMEN: NORMAL
HOLD SPECIMEN: NORMAL
IMM GRANULOCYTES # BLD: 0 10E3/UL
IMM GRANULOCYTES NFR BLD: 0 %
INR PPP: 1.01 (ref 0.85–1.15)
INTERPRETATION ECG - MUSE: NORMAL
KETONES UR STRIP-MCNC: NEGATIVE MG/DL
LEUKOCYTE ESTERASE UR QL STRIP: NEGATIVE
LYMPHOCYTES # BLD AUTO: 2 10E3/UL (ref 0.8–5.3)
LYMPHOCYTES NFR BLD AUTO: 32 %
MCH RBC QN AUTO: 31.5 PG (ref 26.5–33)
MCHC RBC AUTO-ENTMCNC: 33.3 G/DL (ref 31.5–36.5)
MCV RBC AUTO: 95 FL (ref 78–100)
MONOCYTES # BLD AUTO: 0.7 10E3/UL (ref 0–1.3)
MONOCYTES NFR BLD AUTO: 10 %
MUCOUS THREADS #/AREA URNS LPF: PRESENT /LPF
NEUTROPHILS # BLD AUTO: 3.3 10E3/UL (ref 1.6–8.3)
NEUTROPHILS NFR BLD AUTO: 50 %
NITRATE UR QL: NEGATIVE
NRBC # BLD AUTO: 0 10E3/UL
NRBC BLD AUTO-RTO: 0 /100
NT-PROBNP SERPL-MCNC: <36 PG/ML (ref 0–450)
P AXIS - MUSE: 50 DEGREES
PH UR STRIP: 5.5 [PH] (ref 5–7)
PLATELET # BLD AUTO: 235 10E3/UL (ref 150–450)
POTASSIUM SERPL-SCNC: 4.1 MMOL/L (ref 3.4–5.3)
PR INTERVAL - MUSE: 156 MS
PROT SERPL-MCNC: 7.7 G/DL (ref 6.4–8.3)
QRS DURATION - MUSE: 88 MS
QT - MUSE: 430 MS
QTC - MUSE: 430 MS
R AXIS - MUSE: -19 DEGREES
RBC # BLD AUTO: 4.25 10E6/UL (ref 3.8–5.2)
RBC URINE: <1 /HPF
SARS-COV-2 RNA RESP QL NAA+PROBE: NEGATIVE
SODIUM SERPL-SCNC: 130 MMOL/L (ref 136–145)
SP GR UR STRIP: 1.03 (ref 1–1.03)
SQUAMOUS EPITHELIAL: 3 /HPF
SYSTOLIC BLOOD PRESSURE - MUSE: NORMAL MMHG
T AXIS - MUSE: 22 DEGREES
TROPONIN T SERPL HS-MCNC: <6 NG/L
TROPONIN T SERPL HS-MCNC: <6 NG/L
TSH SERPL DL<=0.005 MIU/L-ACNC: 1.3 UIU/ML (ref 0.3–4.2)
UROBILINOGEN UR STRIP-MCNC: NORMAL MG/DL
VENTRICULAR RATE- MUSE: 60 BPM
WBC # BLD AUTO: 6.5 10E3/UL (ref 4–11)
WBC URINE: 4 /HPF

## 2023-02-12 PROCEDURE — 82565 ASSAY OF CREATININE: CPT | Performed by: NURSE PRACTITIONER

## 2023-02-12 PROCEDURE — 258N000003 HC RX IP 258 OP 636: Performed by: FAMILY MEDICINE

## 2023-02-12 PROCEDURE — 93005 ELECTROCARDIOGRAM TRACING: CPT | Performed by: FAMILY MEDICINE

## 2023-02-12 PROCEDURE — G0378 HOSPITAL OBSERVATION PER HR: HCPCS

## 2023-02-12 PROCEDURE — 85025 COMPLETE CBC W/AUTO DIFF WBC: CPT | Performed by: FAMILY MEDICINE

## 2023-02-12 PROCEDURE — 84443 ASSAY THYROID STIM HORMONE: CPT | Performed by: FAMILY MEDICINE

## 2023-02-12 PROCEDURE — 84703 CHORIONIC GONADOTROPIN ASSAY: CPT | Performed by: FAMILY MEDICINE

## 2023-02-12 PROCEDURE — 36415 COLL VENOUS BLD VENIPUNCTURE: CPT | Performed by: PHYSICIAN ASSISTANT

## 2023-02-12 PROCEDURE — 70553 MRI BRAIN STEM W/O & W/DYE: CPT

## 2023-02-12 PROCEDURE — 80053 COMPREHEN METABOLIC PANEL: CPT | Performed by: FAMILY MEDICINE

## 2023-02-12 PROCEDURE — 250N000011 HC RX IP 250 OP 636: Performed by: PHYSICIAN ASSISTANT

## 2023-02-12 PROCEDURE — 258N000003 HC RX IP 258 OP 636: Performed by: PHYSICIAN ASSISTANT

## 2023-02-12 PROCEDURE — 99285 EMERGENCY DEPT VISIT HI MDM: CPT | Mod: 25 | Performed by: FAMILY MEDICINE

## 2023-02-12 PROCEDURE — 71046 X-RAY EXAM CHEST 2 VIEWS: CPT

## 2023-02-12 PROCEDURE — 96376 TX/PRO/DX INJ SAME DRUG ADON: CPT

## 2023-02-12 PROCEDURE — 96361 HYDRATE IV INFUSION ADD-ON: CPT | Performed by: FAMILY MEDICINE

## 2023-02-12 PROCEDURE — 93010 ELECTROCARDIOGRAM REPORT: CPT | Performed by: FAMILY MEDICINE

## 2023-02-12 PROCEDURE — C9803 HOPD COVID-19 SPEC COLLECT: HCPCS | Performed by: FAMILY MEDICINE

## 2023-02-12 PROCEDURE — 96375 TX/PRO/DX INJ NEW DRUG ADDON: CPT | Mod: 59

## 2023-02-12 PROCEDURE — 70450 CT HEAD/BRAIN W/O DYE: CPT

## 2023-02-12 PROCEDURE — 70544 MR ANGIOGRAPHY HEAD W/O DYE: CPT | Mod: XU

## 2023-02-12 PROCEDURE — 84484 ASSAY OF TROPONIN QUANT: CPT | Performed by: PHYSICIAN ASSISTANT

## 2023-02-12 PROCEDURE — 250N000013 HC RX MED GY IP 250 OP 250 PS 637: Performed by: PHYSICIAN ASSISTANT

## 2023-02-12 PROCEDURE — 86140 C-REACTIVE PROTEIN: CPT | Performed by: FAMILY MEDICINE

## 2023-02-12 PROCEDURE — A9585 GADOBUTROL INJECTION: HCPCS | Performed by: FAMILY MEDICINE

## 2023-02-12 PROCEDURE — 99223 1ST HOSP IP/OBS HIGH 75: CPT | Performed by: NURSE PRACTITIONER

## 2023-02-12 PROCEDURE — 36415 COLL VENOUS BLD VENIPUNCTURE: CPT | Performed by: NURSE PRACTITIONER

## 2023-02-12 PROCEDURE — 85610 PROTHROMBIN TIME: CPT | Performed by: FAMILY MEDICINE

## 2023-02-12 PROCEDURE — 84484 ASSAY OF TROPONIN QUANT: CPT | Performed by: FAMILY MEDICINE

## 2023-02-12 PROCEDURE — 255N000002 HC RX 255 OP 636: Performed by: FAMILY MEDICINE

## 2023-02-12 PROCEDURE — 85730 THROMBOPLASTIN TIME PARTIAL: CPT | Performed by: FAMILY MEDICINE

## 2023-02-12 PROCEDURE — 36415 COLL VENOUS BLD VENIPUNCTURE: CPT | Performed by: FAMILY MEDICINE

## 2023-02-12 PROCEDURE — 70549 MR ANGIOGRAPH NECK W/O&W/DYE: CPT

## 2023-02-12 PROCEDURE — 81001 URINALYSIS AUTO W/SCOPE: CPT | Performed by: FAMILY MEDICINE

## 2023-02-12 PROCEDURE — 250N000009 HC RX 250: Performed by: FAMILY MEDICINE

## 2023-02-12 PROCEDURE — 250N000011 HC RX IP 250 OP 636: Performed by: NURSE PRACTITIONER

## 2023-02-12 PROCEDURE — 96374 THER/PROPH/DIAG INJ IV PUSH: CPT | Performed by: FAMILY MEDICINE

## 2023-02-12 PROCEDURE — 250N000013 HC RX MED GY IP 250 OP 250 PS 637: Performed by: FAMILY MEDICINE

## 2023-02-12 PROCEDURE — U0003 INFECTIOUS AGENT DETECTION BY NUCLEIC ACID (DNA OR RNA); SEVERE ACUTE RESPIRATORY SYNDROME CORONAVIRUS 2 (SARS-COV-2) (CORONAVIRUS DISEASE [COVID-19]), AMPLIFIED PROBE TECHNIQUE, MAKING USE OF HIGH THROUGHPUT TECHNOLOGIES AS DESCRIBED BY CMS-2020-01-R: HCPCS | Performed by: FAMILY MEDICINE

## 2023-02-12 PROCEDURE — 83880 ASSAY OF NATRIURETIC PEPTIDE: CPT | Performed by: FAMILY MEDICINE

## 2023-02-12 PROCEDURE — 250N000011 HC RX IP 250 OP 636: Performed by: FAMILY MEDICINE

## 2023-02-12 RX ORDER — SODIUM CHLORIDE 9 MG/ML
INJECTION, SOLUTION INTRAVENOUS CONTINUOUS
Status: DISCONTINUED | OUTPATIENT
Start: 2023-02-12 | End: 2023-02-14 | Stop reason: HOSPADM

## 2023-02-12 RX ORDER — GADOBUTROL 604.72 MG/ML
0.1 INJECTION INTRAVENOUS ONCE
Status: COMPLETED | OUTPATIENT
Start: 2023-02-12 | End: 2023-02-12

## 2023-02-12 RX ORDER — IBUPROFEN 200 MG
400 TABLET ORAL EVERY 6 HOURS PRN
Status: DISCONTINUED | OUTPATIENT
Start: 2023-02-12 | End: 2023-02-14 | Stop reason: HOSPADM

## 2023-02-12 RX ORDER — ONDANSETRON 2 MG/ML
4 INJECTION INTRAMUSCULAR; INTRAVENOUS EVERY 30 MIN PRN
Status: DISCONTINUED | OUTPATIENT
Start: 2023-02-12 | End: 2023-02-14 | Stop reason: HOSPADM

## 2023-02-12 RX ORDER — ONDANSETRON 4 MG/1
4 TABLET, ORALLY DISINTEGRATING ORAL EVERY 6 HOURS PRN
Status: DISCONTINUED | OUTPATIENT
Start: 2023-02-12 | End: 2023-02-14 | Stop reason: HOSPADM

## 2023-02-12 RX ORDER — PROCHLORPERAZINE 25 MG
25 SUPPOSITORY, RECTAL RECTAL EVERY 12 HOURS PRN
Status: DISCONTINUED | OUTPATIENT
Start: 2023-02-12 | End: 2023-02-14 | Stop reason: HOSPADM

## 2023-02-12 RX ORDER — FAMOTIDINE 20 MG/1
40 TABLET, FILM COATED ORAL AT BEDTIME
Status: DISCONTINUED | OUTPATIENT
Start: 2023-02-12 | End: 2023-02-14 | Stop reason: HOSPADM

## 2023-02-12 RX ORDER — ACETAMINOPHEN 500 MG
1000 TABLET ORAL ONCE
Status: COMPLETED | OUTPATIENT
Start: 2023-02-12 | End: 2023-02-12

## 2023-02-12 RX ORDER — NITROGLYCERIN 0.4 MG/1
0.4 TABLET SUBLINGUAL EVERY 5 MIN PRN
Status: DISCONTINUED | OUTPATIENT
Start: 2023-02-12 | End: 2023-02-14 | Stop reason: HOSPADM

## 2023-02-12 RX ORDER — METOPROLOL TARTRATE 50 MG
50-100 TABLET ORAL
Status: DISCONTINUED | OUTPATIENT
Start: 2023-02-12 | End: 2023-02-14 | Stop reason: HOSPADM

## 2023-02-12 RX ORDER — MECLIZINE HYDROCHLORIDE 25 MG/1
25 TABLET ORAL EVERY 8 HOURS
Status: DISCONTINUED | OUTPATIENT
Start: 2023-02-12 | End: 2023-02-14 | Stop reason: HOSPADM

## 2023-02-12 RX ORDER — ONDANSETRON 2 MG/ML
4 INJECTION INTRAMUSCULAR; INTRAVENOUS EVERY 6 HOURS PRN
Status: DISCONTINUED | OUTPATIENT
Start: 2023-02-12 | End: 2023-02-14 | Stop reason: HOSPADM

## 2023-02-12 RX ORDER — DIPHENHYDRAMINE HYDROCHLORIDE 50 MG/ML
25 INJECTION INTRAMUSCULAR; INTRAVENOUS ONCE
Status: COMPLETED | OUTPATIENT
Start: 2023-02-12 | End: 2023-02-12

## 2023-02-12 RX ORDER — ASPIRIN 81 MG/1
324 TABLET, CHEWABLE ORAL ONCE
Status: COMPLETED | OUTPATIENT
Start: 2023-02-12 | End: 2023-02-12

## 2023-02-12 RX ORDER — MECLIZINE HYDROCHLORIDE 25 MG/1
25 TABLET ORAL ONCE
Status: COMPLETED | OUTPATIENT
Start: 2023-02-12 | End: 2023-02-12

## 2023-02-12 RX ORDER — PROCHLORPERAZINE MALEATE 10 MG
10 TABLET ORAL EVERY 6 HOURS PRN
Status: DISCONTINUED | OUTPATIENT
Start: 2023-02-12 | End: 2023-02-14 | Stop reason: HOSPADM

## 2023-02-12 RX ORDER — ACETAMINOPHEN 325 MG/1
650 TABLET ORAL EVERY 6 HOURS PRN
Status: DISCONTINUED | OUTPATIENT
Start: 2023-02-12 | End: 2023-02-14 | Stop reason: HOSPADM

## 2023-02-12 RX ORDER — ACETAMINOPHEN 650 MG/1
650 SUPPOSITORY RECTAL EVERY 6 HOURS PRN
Status: DISCONTINUED | OUTPATIENT
Start: 2023-02-12 | End: 2023-02-14 | Stop reason: HOSPADM

## 2023-02-12 RX ORDER — LIDOCAINE 40 MG/G
CREAM TOPICAL
Status: DISCONTINUED | OUTPATIENT
Start: 2023-02-12 | End: 2023-02-14 | Stop reason: HOSPADM

## 2023-02-12 RX ORDER — MAGNESIUM HYDROXIDE/ALUMINUM HYDROXICE/SIMETHICONE 120; 1200; 1200 MG/30ML; MG/30ML; MG/30ML
30 SUSPENSION ORAL EVERY 4 HOURS PRN
Status: DISCONTINUED | OUTPATIENT
Start: 2023-02-12 | End: 2023-02-14 | Stop reason: HOSPADM

## 2023-02-12 RX ORDER — ASPIRIN 81 MG/1
81 TABLET ORAL DAILY
Status: DISCONTINUED | OUTPATIENT
Start: 2023-02-13 | End: 2023-02-14 | Stop reason: HOSPADM

## 2023-02-12 RX ADMIN — MECLIZINE HYDROCHLORIDE 25 MG: 25 TABLET ORAL at 08:41

## 2023-02-12 RX ADMIN — SODIUM CHLORIDE 1000 ML: 9 INJECTION, SOLUTION INTRAVENOUS at 08:40

## 2023-02-12 RX ADMIN — ACETAMINOPHEN 1000 MG: 500 TABLET ORAL at 15:06

## 2023-02-12 RX ADMIN — GADOBUTROL 9.48 ML: 604.72 INJECTION INTRAVENOUS at 14:00

## 2023-02-12 RX ADMIN — FAMOTIDINE 40 MG: 20 TABLET, FILM COATED ORAL at 21:18

## 2023-02-12 RX ADMIN — SODIUM CHLORIDE: 9 INJECTION, SOLUTION INTRAVENOUS at 20:54

## 2023-02-12 RX ADMIN — ONDANSETRON 4 MG: 2 INJECTION INTRAMUSCULAR; INTRAVENOUS at 08:41

## 2023-02-12 RX ADMIN — ONDANSETRON 4 MG: 2 INJECTION INTRAMUSCULAR; INTRAVENOUS at 21:18

## 2023-02-12 RX ADMIN — DIPHENHYDRAMINE HYDROCHLORIDE 25 MG: 50 INJECTION, SOLUTION INTRAMUSCULAR; INTRAVENOUS at 21:19

## 2023-02-12 RX ADMIN — ALUMINUM HYDROXIDE, MAGNESIUM HYDROXIDE, AND DIMETHICONE 30 ML: 200; 20; 200 SUSPENSION ORAL at 15:23

## 2023-02-12 RX ADMIN — MECLIZINE HYDROCHLORIDE 25 MG: 25 TABLET ORAL at 21:18

## 2023-02-12 RX ADMIN — ASPIRIN 81 MG CHEWABLE TABLET 324 MG: 81 TABLET CHEWABLE at 15:25

## 2023-02-12 ASSESSMENT — ACTIVITIES OF DAILY LIVING (ADL)
ADLS_ACUITY_SCORE: 35
ADLS_ACUITY_SCORE: 31
ADLS_ACUITY_SCORE: 35
ADLS_ACUITY_SCORE: 31
ADLS_ACUITY_SCORE: 35

## 2023-02-12 NOTE — H&P
"[facility]    History and Physical - ED Observation Service       Date of Admission:  2023    Assessment & Plan   Radha Loya is a 42-year-old female with medical history of patent foramen overall and patent ductus arteriosus, bradycardia with possible junctional rhythm in 2018, GERD, vitamin D deficiency, headaches, allergic rhinitis, , ASCUS with positive high risk HPV cervical, and low back pain who presented to [facility] ED on 23 with dizziness and chest pain.    #Dizziness  #History of headaches  Patient endorses dizziness \"room spinning\" sensation x 3 days. She has had this once before in the past but it was self limited, no formal diagnosis of BPPV. She has an associated headache for which she has been taking Tylenol. No tinnitus or hearing loss. No recent URI symptoms. She has had some associated nausea with the dizziness. Her gait has been somewhat ataxic. In the ED, VSS. CBC unremarkable, CMP with sodium of 130. CRP 9. Normal TSH. MRI brain with no acute pathology. There is non specific white matter T2/FLAIR signal abnormality which is mildly prominent for patient's age. This was discussed with neurology who felt it was possibly secondary to history of migraines and that no further testing was warranted. If patient continues to have issues with headaches in the future can be referred to outpatient neuro. MRA brain and neck were unremarkable.  -Scheduled Meclizine  -IV NS @ 75 mL/hr  -PT evaluation  -1 dose of IV Benadryl given with meclizine and antiemetics  -Antiemetics as needed    #Chest pain  #History of patent foramen overall and patent ductus arteriosus  #History of bradycardia with possible junctional rhythm in 2018  Patient also endorses chest pain which is sharp and mid sternal. Pain is rated a 5/10 in intensity. No associated dyspnea or diaphoresis. Pain does not radiate. No improvement with GI cocktail in the ED. The patient " "had exercise a stress test in 2006 or 2007 which she was diagnosed with patent foramen overall and patent ductus arteriosus.  The patient was told she does not need surgery and her heart is stable, but she has not had any follow-up since.  EKG sinus rhythm with sinus arrhythmia. Troponin <6. Pro-BNP <36. CXR NAD.  Discussed with cardiology who recommended CT angiogram in the morning and an official consult in a.m.  -NPO after midnight  -Continuous telemetry  -Serial troponin  -Continue ASA  -Nitroglycerin as needed  -CT angiogram in am per cardiology recommendation   -Cardiology consult in am     #R lower extremity pain  -Lower extremity ultrasound bilaterally to rule out DVT    #Hyponatremia  Sodium 130 on admission, treated with IV NS 1 L bolus.  -Repeat BMP tomorrow AM  -Continue IV NS @ 75 mL/hr    #GERD  -Continue with home Pepcid 40 mg at bedtime     Diet:  NPO after midnight   DVT Prophylaxis: Anticipating a short admission, encouraged patient to ambulate as much as possible with her dizziness  Code Status: Full code                     # Obesity: Estimated body mass index is 38.23 kg/m  as calculated from the following:    Height as of this encounter: 1.575 m (5' 2\").    Weight as of this encounter: 94.8 kg (209 lb).           Disposition Plan  : Discharged home     Expected Discharge Date: 02/13/2023                ______________________________________________________________________    Chief Complaint   Dizziness, chest pain    History is obtained from the patient    History of Present Illness   Per ED: \"Radha Loya is a 42 year old female who emergency room with 3 days of dizziness.  Patient notes having this once before in the past but no diagnosis of benign positional vertigo no history Ménière's etc.  Patient has a headache which she typically gets and take Tylenol for no fevers or chills no sinus headache symptoms.  As noted no tinnitus or hearing loss reported this point.  Patient notes vertigo-like " "symptoms for last 3 days worse with movement etc. some nausea associated with this also.  Patient generally weak without focal findings otherwise notes gait is somewhat ataxic also.  No falls or trauma otherwise.  Did not report any significant chest pain although and reported some sharp mid chest pain also.  Now presents for evaluation.\"    Past Medical History    History reviewed. No pertinent past medical history.    Past Surgical History   Past Surgical History:   Procedure Laterality Date     GYN SURGERY       Prior to Admission Medications   Prior to Admission Medications   Prescriptions Last Dose Informant Patient Reported? Taking?   acetaminophen (TYLENOL) 325 MG tablet Past Week  Yes Yes   Sig: Take 325-650 mg by mouth every 6 hours as needed for mild pain   famotidine (PEPCID) 40 MG tablet More than a month  No Yes   Sig: Take 1 tablet (40 mg) by mouth At Bedtime   ibuprofen (ADVIL/MOTRIN) 600 MG tablet Past Week  No Yes   Sig: Take 1 tablet (600 mg) by mouth every 6 hours as needed for moderate pain   vitamin B-12 (CYANOCOBALAMIN) 1000 MCG tablet Past Week  Yes Yes   Sig: Take 1,000 mcg by mouth   vitamin D3 (CHOLECALCIFEROL) 1.25 MG (37927 UT) capsule Past Week  Yes Yes   Sig: Take 50,000 Units by mouth      Facility-Administered Medications: None      Review of Systems    A medically appropriate review of systems was performed with pertinent positives and negatives noted in the HPI, and all other systems negative.    Physical Exam   Vital Signs: Temp: 97.8  F (36.6  C) Temp src: Oral BP: 111/58 Pulse: 66   Resp: 18 SpO2: 98 % O2 Device: None (Room air)    Weight: 209 lbs 0 oz    Constitutional:       General: Not in acute distress       Appearance: She is well-developed. She is not toxic-appearing or diaphoretic.   HENT:      Head: Normocephalic and atraumatic.   Eyes:      General: No scleral icterus.  Cardiovascular:      Rate and Rhythm: Normal rate and regular rhythm.   Pulmonary:      Effort: No " respiratory distress.   Chest:      Chest wall: No tenderness.   Abdominal:      General: There is no distension.      Palpations: Abdomen is soft. There is no mass.      Tenderness: There is no abdominal tenderness.   Musculoskeletal:      Cervical back: Normal range of motion and neck supple. RLE/R calf tenderness to palpation.   Skin:     General: Skin is warm and dry.      Capillary Refill: Capillary refill takes less than 2 seconds.      Coloration: Skin is not pale.      Findings: No erythema or rash.   Neurological:      Mental Status: She is alert and oriented to person, place, and time.    Data     I have personally reviewed the following data over the past 24 hrs:    6.5  \   13.4   / 235     130 (L) 98 18.4 /  88   4.1 24 0.81 \       ALT: 16 AST: 20 AP: 57 TBILI: 0.7   ALB: 4.6 TOT PROTEIN: 7.7 LIPASE: N/A       Trop: <6 BNP: <36       TSH: 1.30 T4: N/A A1C: N/A       Procal: N/A CRP: 9.10 (H) Lactic Acid: N/A       INR:  1.01 PTT:  28   D-dimer:  N/A Fibrinogen:  N/A          Misbil TANIKA Morales CNP (ED Observation Moonlighter)  ED Observation Unit   Phone: [phone]

## 2023-02-12 NOTE — ED TRIAGE NOTES
Patient presents due to dizziness, lightheaded and chest pain; present for 3 days. Patient reports 5/10 sharp pain medial chest.      Triage Assessment     Row Name 02/12/23 0746       Triage Assessment (Adult)    Airway WDL WDL       Respiratory WDL    Respiratory WDL WDL       Skin Circulation/Temperature WDL    Skin Circulation/Temperature WDL WDL       Cardiac WDL    Cardiac WDL X;chest pain       Chest Pain Assessment    Chest Pain Location midsternal    Character sharp    Precipitating Factors at rest    Chest Pain Intervention 12-lead ECG obtained       Peripheral/Neurovascular WDL    Peripheral Neurovascular WDL WDL       Cognitive/Neuro/Behavioral WDL    Cognitive/Neuro/Behavioral WDL WDL

## 2023-02-12 NOTE — ED PROVIDER NOTES
ED Provider Note  [facility]      History     Chief Complaint   Patient presents with     Dizziness     Patient presents due to dizziness, lightheaded and chest pain; present for 3 days. Patient reports 5/10 sharp pain medial chest.      HPI  Radha Loya is a 42 year old female who emergency room with 3 days of dizziness.  Patient notes having this once before in the past but no diagnosis of benign positional vertigo no history Ménière's etc.  Patient has a headache which she typically gets and take Tylenol for no fevers or chills no sinus headache symptoms.  As noted no tinnitus or hearing loss reported this point.  Patient notes vertigo-like symptoms for last 3 days worse with movement etc. some nausea associated with this also.  Patient generally weak without focal findings otherwise notes gait is somewhat ataxic also.  No falls or trauma otherwise.  Did not report any significant chest pain although and reported some sharp mid chest pain also.  Now presents for evaluation.    Patient later does disclose having chest pressure last few days with some palpitations at times no true exertional dyspnea or syncope with this.  No coughing no GI symptoms no previous history symptoms to me are more pressure not pleuritic.  No leg pain or leg swelling noted at this point.    Past Medical History  History reviewed. No pertinent past medical history.  Past Surgical History:   Procedure Laterality Date     GYN SURGERY       No current outpatient medications on file.    Allergies   Allergen Reactions     Chloroquine      Taken in [place]     Family History  History reviewed. No pertinent family history.  Social History   Social History     Tobacco Use     Smoking status: Never     Smokeless tobacco: Never   Substance Use Topics     Alcohol use: No     Drug use: Not Currently         A medically appropriate review of systems was performed with pertinent positives and negatives noted in the HPI, and  "all other systems negative.    Physical Exam   BP: 111/58  Pulse: 66  Temp: 97.8  F (36.6  C)  Resp: 18  Height: 157.5 cm (5' 2\")  Weight: 94.8 kg (209 lb)  SpO2: 98 %  Physical Exam  Vitals and nursing note reviewed.   Constitutional:       General: She is in acute distress.      Appearance: She is well-developed. She is not toxic-appearing or diaphoretic.      Comments: Patient complaining of dizziness noted here in the ER some nausea also noted patient later had complained of chest pressure and palpitations also.   HENT:      Head: Normocephalic and atraumatic.      Right Ear: Tympanic membrane normal.      Left Ear: Tympanic membrane normal.      Nose: No congestion or rhinorrhea.      Mouth/Throat:      Mouth: Mucous membranes are moist.   Eyes:      General: No scleral icterus.     Extraocular Movements: Extraocular movements intact.      Conjunctiva/sclera: Conjunctivae normal.      Pupils: Pupils are equal, round, and reactive to light.   Cardiovascular:      Rate and Rhythm: Normal rate and regular rhythm.   Pulmonary:      Effort: No respiratory distress.   Chest:      Chest wall: No tenderness.   Abdominal:      General: There is no distension.      Palpations: Abdomen is soft. There is no mass.      Tenderness: There is no abdominal tenderness.   Musculoskeletal:         General: No swelling or tenderness.      Cervical back: Normal range of motion and neck supple. No rigidity.   Skin:     General: Skin is warm and dry.      Capillary Refill: Capillary refill takes less than 2 seconds.      Coloration: Skin is not jaundiced or pale.      Findings: No erythema or rash.   Neurological:      General: No focal deficit present.      Mental Status: She is alert and oriented to person, place, and time. Mental status is at baseline.   Psychiatric:      Comments: Mildly flat affect otherwise appropriate           ED Course, Procedures, & Data         Patient valuated the ER.  EKG was done sinus rhythm no " hyperacute ischemic changes.  IV established liter normal saline meclizine given along with Zofran IV and orally.    Labs reviewed white count 6.5 hemoglobin 13.4.  Liver function test normal limits.  Sodium 130.  Potassium 4.1.  TSH 1.30.  CRP is 9.1.  Urinalysis no significant findings.  Patient is not pregnant  COVID testing negative otherwise.  Troponin negative.  BNP negative.    Head CT was done without acute abnormalities noted.    Patient reassessed did receive IV fluid another bolus also in the ER.  MRI scan was done stroke protocol.  Findings reviewed with Dr. Lewis neurology at this point there is some nonspecific I believe white matter changes seen just slightly different than age would expect.  I talked to Dr. Lewis.  Patient has history of some migraine headaches this may be just typical with this felt at this point no indication for follow-up needed unless ongoing symptoms could follow-up as an outpatient with neurology.    Discussed with patient regarding this also does understand agrees.    Patient then was talking more about her palpitations chest pressure did receive GI cocktail also continually monitored patient chest x-rays ordered results are pending at this point.  Plan at this point admit to ED observation for chest pain palpitation evaluation along with more likely benign positional vertigo management also.  Patient agrees with plan will be admitted discussed with Reshma TEE on the observation unit agrees.  Procedures       ED Course Selections:        EKG Interpretation:      Interpreted by Osman Waterman MD  Time reviewed: 0800  Symptoms at time of EKG: dizziness and cp and palpitations   Rhythm: normal sinus   Rate: normal  Axis: normal  Ectopy: none  Conduction: normal  ST Segments/ T Waves: No ST-T wave changes  Q Waves: none  Comparison to prior: No old EKG available    Clinical Impression: normal EKG                Chest x-ray negative.     Results for orders  placed or performed during the hospital encounter of 02/12/23   CT Head w/o Contrast     Status: None    Narrative    EXAM: CT HEAD W/O CONTRAST  LOCATION: [facility]  DATE/TIME: 2/12/2023 10:25 AM    INDICATION: ha and dizziness  COMPARISON: None.  TECHNIQUE: Routine CT Head without IV contrast. Multiplanar reformats. Dose reduction techniques were used.    FINDINGS:  INTRACRANIAL CONTENTS: No intracranial hemorrhage. Normal cerebral parenchymal volume. No midline shift. The basilar cisterns are patent. No CT evidence for an acute infarct. No cerebellar tonsillar ectopia. Small partially calcified nodule in the floor   of the fourth ventricle.    VISUALIZED ORBITS/SINUSES/MASTOIDS: No intraorbital abnormality. Mild to moderate mucosal thickening of the ethmoid air cells. Mild chronic mucosal thickening of the right maxillary sinus. No middle ear or mastoid effusion.    BONES/SOFT TISSUES: No acute abnormality. Prominent adenoid lymphoid tissue, likely reactive.      Impression    IMPRESSION:  1.  No intracranial hemorrhage, mass lesions, hydrocephalus or CT evidence for an acute infarct.  2.  Small partially calcified nodule in the floor of the fourth ventricle, nonspecific. Favor to reflect choroid plexus calcification.  3.  Mild to moderate mucosal thickening of the ethmoid air cells. Mild chronic mucosal thickening of the right maxillary sinus.    MR Brain w/o & w Contrast     Status: None    Narrative    EXAM: MR BRAIN W/O and W CONTRAST, MRA NECK (CAROTIDS) W/O and W CONTRAST, MRA BRAIN ([place]) W/O CONTRAST  LOCATION: [facility]  DATE/TIME: 2/12/2023 2:01 PM    INDICATION: Headache; Acute HA (< 3 months), no complicating features  COMPARISON: CT 02/12/2023  CONTRAST: 9.5ml gadovist  TECHNIQUE:   1) Routine multiplanar multisequence head MRI without and with intravenous contrast.  2) 3D time-of-flight head MRA  without intravenous contrast.  3) Neck MRA without and with IV contrast. Stenosis measurements made according to NASCET criteria unless otherwise specified.    FINDINGS:  HEAD MRI:  INTRACRANIAL CONTENTS: No acute or subacute infarct. No mass, acute hemorrhage, or extra-axial fluid collections. Scattered nonspecific foci of T2/FLAIR hyperintense signal in the cerebral white matter. There is no involvement of the brachium pontis,   anterior temporal white matter, or corpus callosum. Normal ventricles and sulci. Normal position of the cerebellar tonsils. No pathologic contrast enhancement. No corresponding MR signal abnormality or enhancement to the area of hyperattenuation in the   fourth ventricle on comparison head CT which likely represents calcified choroid plexus.    SELLA: No abnormality accounting for technique.    OSSEOUS STRUCTURES/SOFT TISSUES: Normal marrow signal. The major intracranial vascular flow voids are maintained.     ORBITS: No abnormality accounting for technique.     SINUSES/MASTOIDS: Moderate mucosal thickening scattered about the paranasal sinuses. A left frontal air fluid levels noted. No middle ear or mastoid effusion.     HEAD MRA:   ANTERIOR CIRCULATION: No stenosis/occlusion, aneurysm, or high flow vascular malformation. Fetal origin of the left posterior cerebral artery from the anterior circulation.    POSTERIOR CIRCULATION: No stenosis/occlusion, aneurysm, or high flow vascular malformation. Balanced vertebral arteries supply a normal basilar artery.     NECK MRA:   RIGHT CAROTID: No measurable stenosis or dissection.    LEFT CAROTID: No measurable stenosis or dissection.    VERTEBRAL ARTERIES: No focal stenosis or dissection. Balanced vertebral arteries.    AORTIC ARCH: Aortic arch is incompletely imaged. Suspect bovine anatomy of the arch. There is no significant stenosis in the visualized great vessels.      Impression    IMPRESSION:  HEAD MRI:   1.  No acute intracranial pathology  or abnormal intracranial enhancement.  2.  Nonspecific scattered white matter T2/FLAIR signal abnormality is noted which mildly prominent for patients age. Findings are nonspecific and could be seen with chronic migraine, sequela of remote insult, chronic small vessel ischemic disease, or   demyelinating process.   3.  Moderate paranasal sinus disease and left frontal air fluid level, nonspecific, can be seen with acute sinusitis in the appropriate clinical setting.    HEAD MRA:   1.  No aneurysm, high flow AVM or significant stenosis identified.  2.  Variant [place] anatomy as above.    NECK MRA:  1.  Normal neck MRA.   MRA Brain ([place]) wo Contrast     Status: None    Narrative    EXAM: MR BRAIN W/O and W CONTRAST, MRA NECK (CAROTIDS) W/O and W CONTRAST, MRA BRAIN ([place]) W/O CONTRAST  LOCATION: [facility]  DATE/TIME: 2/12/2023 2:01 PM    INDICATION: Headache; Acute HA (< 3 months), no complicating features  COMPARISON: CT 02/12/2023  CONTRAST: 9.5ml gadovist  TECHNIQUE:   1) Routine multiplanar multisequence head MRI without and with intravenous contrast.  2) 3D time-of-flight head MRA without intravenous contrast.  3) Neck MRA without and with IV contrast. Stenosis measurements made according to NASCET criteria unless otherwise specified.    FINDINGS:  HEAD MRI:  INTRACRANIAL CONTENTS: No acute or subacute infarct. No mass, acute hemorrhage, or extra-axial fluid collections. Scattered nonspecific foci of T2/FLAIR hyperintense signal in the cerebral white matter. There is no involvement of the brachium pontis,   anterior temporal white matter, or corpus callosum. Normal ventricles and sulci. Normal position of the cerebellar tonsils. No pathologic contrast enhancement. No corresponding MR signal abnormality or enhancement to the area of hyperattenuation in the   fourth ventricle on comparison head CT which likely represents calcified  choroid plexus.    SELLA: No abnormality accounting for technique.    OSSEOUS STRUCTURES/SOFT TISSUES: Normal marrow signal. The major intracranial vascular flow voids are maintained.     ORBITS: No abnormality accounting for technique.     SINUSES/MASTOIDS: Moderate mucosal thickening scattered about the paranasal sinuses. A left frontal air fluid levels noted. No middle ear or mastoid effusion.     HEAD MRA:   ANTERIOR CIRCULATION: No stenosis/occlusion, aneurysm, or high flow vascular malformation. Fetal origin of the left posterior cerebral artery from the anterior circulation.    POSTERIOR CIRCULATION: No stenosis/occlusion, aneurysm, or high flow vascular malformation. Balanced vertebral arteries supply a normal basilar artery.     NECK MRA:   RIGHT CAROTID: No measurable stenosis or dissection.    LEFT CAROTID: No measurable stenosis or dissection.    VERTEBRAL ARTERIES: No focal stenosis or dissection. Balanced vertebral arteries.    AORTIC ARCH: Aortic arch is incompletely imaged. Suspect bovine anatomy of the arch. There is no significant stenosis in the visualized great vessels.      Impression    IMPRESSION:  HEAD MRI:   1.  No acute intracranial pathology or abnormal intracranial enhancement.  2.  Nonspecific scattered white matter T2/FLAIR signal abnormality is noted which mildly prominent for patients age. Findings are nonspecific and could be seen with chronic migraine, sequela of remote insult, chronic small vessel ischemic disease, or   demyelinating process.   3.  Moderate paranasal sinus disease and left frontal air fluid level, nonspecific, can be seen with acute sinusitis in the appropriate clinical setting.    HEAD MRA:   1.  No aneurysm, high flow AVM or significant stenosis identified.  2.  Variant [place] anatomy as above.    NECK MRA:  1.  Normal neck MRA.   MRA Neck (Carotids) wo & w Contrast     Status: None    Narrative    EXAM: MR BRAIN W/O and W CONTRAST, MRA NECK (CAROTIDS)  W/O and W CONTRAST, MRA BRAIN ([place]) W/O CONTRAST  LOCATION: [facility]  DATE/TIME: 2/12/2023 2:01 PM    INDICATION: Headache; Acute HA (< 3 months), no complicating features  COMPARISON: CT 02/12/2023  CONTRAST: 9.5ml gadovist  TECHNIQUE:   1) Routine multiplanar multisequence head MRI without and with intravenous contrast.  2) 3D time-of-flight head MRA without intravenous contrast.  3) Neck MRA without and with IV contrast. Stenosis measurements made according to NASCET criteria unless otherwise specified.    FINDINGS:  HEAD MRI:  INTRACRANIAL CONTENTS: No acute or subacute infarct. No mass, acute hemorrhage, or extra-axial fluid collections. Scattered nonspecific foci of T2/FLAIR hyperintense signal in the cerebral white matter. There is no involvement of the brachium pontis,   anterior temporal white matter, or corpus callosum. Normal ventricles and sulci. Normal position of the cerebellar tonsils. No pathologic contrast enhancement. No corresponding MR signal abnormality or enhancement to the area of hyperattenuation in the   fourth ventricle on comparison head CT which likely represents calcified choroid plexus.    SELLA: No abnormality accounting for technique.    OSSEOUS STRUCTURES/SOFT TISSUES: Normal marrow signal. The major intracranial vascular flow voids are maintained.     ORBITS: No abnormality accounting for technique.     SINUSES/MASTOIDS: Moderate mucosal thickening scattered about the paranasal sinuses. A left frontal air fluid levels noted. No middle ear or mastoid effusion.     HEAD MRA:   ANTERIOR CIRCULATION: No stenosis/occlusion, aneurysm, or high flow vascular malformation. Fetal origin of the left posterior cerebral artery from the anterior circulation.    POSTERIOR CIRCULATION: No stenosis/occlusion, aneurysm, or high flow vascular malformation. Balanced vertebral arteries supply a normal basilar artery.     NECK MRA:   RIGHT  CAROTID: No measurable stenosis or dissection.    LEFT CAROTID: No measurable stenosis or dissection.    VERTEBRAL ARTERIES: No focal stenosis or dissection. Balanced vertebral arteries.    AORTIC ARCH: Aortic arch is incompletely imaged. Suspect bovine anatomy of the arch. There is no significant stenosis in the visualized great vessels.      Impression    IMPRESSION:  HEAD MRI:   1.  No acute intracranial pathology or abnormal intracranial enhancement.  2.  Nonspecific scattered white matter T2/FLAIR signal abnormality is noted which mildly prominent for patients age. Findings are nonspecific and could be seen with chronic migraine, sequela of remote insult, chronic small vessel ischemic disease, or   demyelinating process.   3.  Moderate paranasal sinus disease and left frontal air fluid level, nonspecific, can be seen with acute sinusitis in the appropriate clinical setting.    HEAD MRA:   1.  No aneurysm, high flow AVM or significant stenosis identified.  2.  Variant [place] anatomy as above.    NECK MRA:  1.  Normal neck MRA.   XR Chest 2 Views     Status: None    Narrative    EXAM: XR CHEST 2 VIEWS  LOCATION: [facility]  DATE/TIME: 2/12/2023 3:40 PM    INDICATION: Chest pain and palpitations  COMPARISON: None.      Impression    IMPRESSION: Negative chest.   [place] Draw     Status: None    Narrative    The following orders were created for panel order [place] Draw.  Procedure                               Abnormality         Status                     ---------                               -----------         ------                     Extra Blue Top Tube[[id number]]                              Final result               Extra Green Top (Lithium...[[id number]]                                                 Extra Purple Top Tube[[id number]]                            Final result                 Please view results for these tests on the individual  orders.   Extra Blue Top Tube     Status: None   Result Value Ref Range    Hold Specimen JIC    Extra Purple Top Tube     Status: None   Result Value Ref Range    Hold Specimen JIC    CRP inflammation     Status: Abnormal   Result Value Ref Range    CRP Inflammation 9.10 (H) <5.00 mg/L   Partial thromboplastin time     Status: Normal   Result Value Ref Range    aPTT 28 22 - 38 Seconds   INR     Status: Normal   Result Value Ref Range    INR 1.01 0.85 - 1.15   Comprehensive metabolic panel     Status: Abnormal   Result Value Ref Range    Sodium 130 (L) 136 - 145 mmol/L    Potassium 4.1 3.4 - 5.3 mmol/L    Chloride 98 98 - 107 mmol/L    Carbon Dioxide (CO2) 24 22 - 29 mmol/L    Anion Gap 8 7 - 15 mmol/L    Urea Nitrogen 18.4 6.0 - 20.0 mg/dL    Creatinine 0.81 0.51 - 0.95 mg/dL    Calcium 9.0 8.6 - 10.0 mg/dL    Glucose 88 70 - 99 mg/dL    Alkaline Phosphatase 57 35 - 104 U/L    AST 20 10 - 35 U/L    ALT 16 10 - 35 U/L    Protein Total 7.7 6.4 - 8.3 g/dL    Albumin 4.6 3.5 - 5.2 g/dL    Bilirubin Total 0.7 <=1.2 mg/dL    GFR Estimate >90 >60 mL/min/1.73m2   Troponin T, High Sensitivity     Status: Normal   Result Value Ref Range    Troponin T, High Sensitivity <6 <=14 ng/L   TSH     Status: Normal   Result Value Ref Range    TSH 1.30 0.30 - 4.20 uIU/mL   Nt probnp inpatient (BNP)     Status: Normal   Result Value Ref Range    N terminal Pro BNP Inpatient <36 0 - 450 pg/mL   HCG qualitative Blood     Status: Normal   Result Value Ref Range    hCG Serum Qualitative Negative Negative   UA with Microscopic reflex to Culture     Status: Abnormal    Specimen: Urine, Midstream   Result Value Ref Range    Color Urine Light Yellow Colorless, Straw, Light Yellow, Yellow    Appearance Urine Clear Clear    Glucose Urine Negative Negative mg/dL    Bilirubin Urine Negative Negative    Ketones Urine Negative Negative mg/dL    Specific Gravity Urine 1.027 1.003 - 1.035    Blood Urine Negative Negative    pH Urine 5.5 5.0 - 7.0     Protein Albumin Urine 10 (A) Negative mg/dL    Urobilinogen Urine Normal Normal, 2.0 mg/dL    Nitrite Urine Negative Negative    Leukocyte Esterase Urine Negative Negative    Bacteria Urine Few (A) None Seen /HPF    Mucus Urine Present (A) None Seen /LPF    RBC Urine <1 <=2 /HPF    WBC Urine 4 <=5 /HPF    Squamous Epithelials Urine 3 (H) <=1 /HPF    Narrative    Urine Culture not indicated   CBC with platelets and differential     Status: None   Result Value Ref Range    WBC Count 6.5 4.0 - 11.0 10e3/uL    RBC Count 4.25 3.80 - 5.20 10e6/uL    Hemoglobin 13.4 11.7 - 15.7 g/dL    Hematocrit 40.2 35.0 - 47.0 %    MCV 95 78 - 100 fL    MCH 31.5 26.5 - 33.0 pg    MCHC 33.3 31.5 - 36.5 g/dL    RDW 12.4 10.0 - 15.0 %    Platelet Count 235 150 - 450 10e3/uL    % Neutrophils 50 %    % Lymphocytes 32 %    % Monocytes 10 %    % Eosinophils 7 %    % Basophils 1 %    % Immature Granulocytes 0 %    NRBCs per 100 WBC 0 <1 /100    Absolute Neutrophils 3.3 1.6 - 8.3 10e3/uL    Absolute Lymphocytes 2.0 0.8 - 5.3 10e3/uL    Absolute Monocytes 0.7 0.0 - 1.3 10e3/uL    Absolute Eosinophils 0.4 0.0 - 0.7 10e3/uL    Absolute Basophils 0.1 0.0 - 0.2 10e3/uL    Absolute Immature Granulocytes 0.0 <=0.4 10e3/uL    Absolute NRBCs 0.0 10e3/uL   Asymptomatic COVID-19 Virus (Coronavirus) by PCR Nasopharyngeal     Status: Normal    Specimen: Nasopharyngeal; Swab   Result Value Ref Range    SARS CoV2 PCR Negative Negative    Narrative    Testing was performed using the Xpert Xpress SARS-CoV-2 Assay on the Cepheid Gene-Xpert Instrument Systems. Additional information about this Emergency Use Authorization (EUA) assay can be found via the Lab Guide. This test should be ordered for the detection of SARS-CoV-2 in individuals who meet SARS-CoV-2 clinical and/or epidemiological criteria as well as from individuals without symptoms or other reasons to suspect COVID-19. Test performance for asymptomatic patients has only been established in [facility] nasal  swab specimens. This test is for in vitro diagnostic use under the FDA EUA for laboratories certified under CLIA to perform high complexity testing. This test has not been FDA cleared or approved. A negative result does not rule out the presence of PCR inhibitors in the specimen or target RNA concentration below the limit of detection for the assay. The possibility of a false negative should be considered if the patient's recent exposure or clinical presentation suggests COVID-19. This test was validated by the [facility]. This laboratory is certified under the Clinical Laboratory Improvement Amendments (CLIA) as qualified to perform high complexity laboratory testing.     Troponin T, High Sensitivity     Status: Normal   Result Value Ref Range    Troponin T, High Sensitivity <6 <=14 ng/L   Creatinine     Status: Normal   Result Value Ref Range    Creatinine 0.89 0.51 - 0.95 mg/dL    GFR Estimate 82 >60 mL/min/1.73m2   EKG 12 lead     Status: None   Result Value Ref Range    Systolic Blood Pressure  mmHg    Diastolic Blood Pressure  mmHg    Ventricular Rate 60 BPM    Atrial Rate 60 BPM    NE Interval 156 ms    QRS Duration 88 ms     ms    QTc 430 ms    P Axis 50 degrees    R AXIS -19 degrees    T Axis 22 degrees    Interpretation ECG       Sinus rhythm with sinus arrhythmia  Normal ECG  Unconfirmed report - interpretation of this ECG is computer generated - see medical record for final interpretation    Confirmed by - EMERGENCY ROOM, PHYSICIAN ([id number]),  OMERO HERNANDEZ ([id number]) on 2/12/2023 8:13:42 AM     CBC with platelets differential     Status: None    Narrative    The following orders were created for panel order CBC with platelets differential.  Procedure                               Abnormality         Status                     ---------                               -----------         ------                     CBC with platelets and d...[[id number]]                       Final result                 Please view results for these tests on the individual orders.     Medications   lidocaine 1 % 0.1-1 mL (has no administration in time range)   lidocaine (LMX4) cream (has no administration in time range)   sodium chloride (PF) 0.9% PF flush 3 mL (3 mLs Intracatheter Not Given 2/13/23 0035)   sodium chloride (PF) 0.9% PF flush 3 mL (has no administration in time range)   ondansetron (ZOFRAN) injection 4 mg (4 mg Intravenous Not Given 2/12/23 2119)   famotidine (PEPCID) tablet 40 mg (40 mg Oral Given 2/12/23 2118)   aspirin EC tablet 81 mg (has no administration in time range)   nitroGLYcerin (NITROSTAT) sublingual tablet 0.4 mg (has no administration in time range)   alum & mag hydroxide-simethicone (MAALOX) suspension 30 mL (has no administration in time range)   acetaminophen (TYLENOL) tablet 650 mg (has no administration in time range)     Or   acetaminophen (TYLENOL) Suppository 650 mg (has no administration in time range)   meclizine (ANTIVERT) tablet 25 mg (25 mg Oral Given 2/12/23 2118)   ondansetron (ZOFRAN ODT) ODT tab 4 mg ( Oral See Alternative 2/12/23 2118)     Or   ondansetron (ZOFRAN) injection 4 mg (4 mg Intravenous Given 2/12/23 2118)   prochlorperazine (COMPAZINE) injection 10 mg (has no administration in time range)     Or   prochlorperazine (COMPAZINE) tablet 10 mg (has no administration in time range)     Or   prochlorperazine (COMPAZINE) suppository 25 mg (has no administration in time range)   sodium chloride 0.9% infusion ( Intravenous New Bag 2/12/23 2054)   ibuprofen (ADVIL/MOTRIN) tablet 400 mg (has no administration in time range)   sodium chloride (PF) 0.9% PF flush 10 mL (10 mLs Intravenous Given 2/12/23 2118)   IF patient diabetic - HOLD: ALL ORAL HYPOGLYCEMICS: glipizide, glyburide, glimepiride, gliclazide, metformin (Glucophage), any metformin (Glucophage) containing medication, rosiglitazone (Avandia), pioglitazone (Actos), or sitagliptin (Januvia)  on day of the procedure (has no administration in time range)   metoprolol tartrate (LOPRESSOR) tablet  mg (has no administration in time range)   0.9% sodium chloride BOLUS (0 mLs Intravenous Stopped 2/12/23 1021)   meclizine (ANTIVERT) tablet 25 mg (25 mg Oral Given 2/12/23 0841)   gadobutrol (GADAVIST) injection 9.48 mL (9.48 mLs Intravenous Given 2/12/23 1400)   acetaminophen (TYLENOL) tablet 1,000 mg (1,000 mg Oral Given 2/12/23 1506)   lidocaine (viscous) (XYLOCAINE) 2 % 15 mL, alum & mag hydroxide-simethicone (MAALOX) 15 mL GI Cocktail (30 mLs Oral Given 2/12/23 1523)   aspirin (ASA) chewable tablet 324 mg (324 mg Oral Given 2/12/23 1525)   sodium chloride (PF) 0.9% PF flush 10 mL (10 mLs Intravenous Not Given 2/12/23 2112)   diphenhydrAMINE (BENADRYL) injection 25 mg (25 mg Intravenous Given 2/12/23 2119)     Labs Ordered and Resulted from Time of ED Arrival to Time of ED Departure   CRP INFLAMMATION - Abnormal       Result Value    CRP Inflammation 9.10 (*)    COMPREHENSIVE METABOLIC PANEL - Abnormal    Sodium 130 (*)     Potassium 4.1      Chloride 98      Carbon Dioxide (CO2) 24      Anion Gap 8      Urea Nitrogen 18.4      Creatinine 0.81      Calcium 9.0      Glucose 88      Alkaline Phosphatase 57      AST 20      ALT 16      Protein Total 7.7      Albumin 4.6      Bilirubin Total 0.7      GFR Estimate >90     ROUTINE UA WITH MICROSCOPIC REFLEX TO CULTURE - Abnormal    Color Urine Light Yellow      Appearance Urine Clear      Glucose Urine Negative      Bilirubin Urine Negative      Ketones Urine Negative      Specific Gravity Urine 1.027      Blood Urine Negative      pH Urine 5.5      Protein Albumin Urine 10 (*)     Urobilinogen Urine Normal      Nitrite Urine Negative      Leukocyte Esterase Urine Negative      Bacteria Urine Few (*)     Mucus Urine Present (*)     RBC Urine <1      WBC Urine 4      Squamous Epithelials Urine 3 (*)    PARTIAL THROMBOPLASTIN TIME - Normal    aPTT 28     INR -  Normal    INR 1.01     TROPONIN T, HIGH SENSITIVITY - Normal    Troponin T, High Sensitivity <6     TSH - Normal    TSH 1.30     NT PROBNP INPATIENT - Normal    N terminal Pro BNP Inpatient <36     HCG QUALITATIVE PREGNANCY - Normal    hCG Serum Qualitative Negative     COVID-19 VIRUS (CORONAVIRUS) BY PCR - Normal    SARS CoV2 PCR Negative     TROPONIN T, HIGH SENSITIVITY - Normal    Troponin T, High Sensitivity <6     CBC WITH PLATELETS AND DIFFERENTIAL    WBC Count 6.5      RBC Count 4.25      Hemoglobin 13.4      Hematocrit 40.2      MCV 95      MCH 31.5      MCHC 33.3      RDW 12.4      Platelet Count 235      % Neutrophils 50      % Lymphocytes 32      % Monocytes 10      % Eosinophils 7      % Basophils 1      % Immature Granulocytes 0      NRBCs per 100 WBC 0      Absolute Neutrophils 3.3      Absolute Lymphocytes 2.0      Absolute Monocytes 0.7      Absolute Eosinophils 0.4      Absolute Basophils 0.1      Absolute Immature Granulocytes 0.0      Absolute NRBCs 0.0       XR Chest 2 Views   Final Result   IMPRESSION: Negative chest.      MRA Brain ([place]) wo Contrast   Final Result   IMPRESSION:   HEAD MRI:    1.  No acute intracranial pathology or abnormal intracranial enhancement.   2.  Nonspecific scattered white matter T2/FLAIR signal abnormality is noted which mildly prominent for patients age. Findings are nonspecific and could be seen with chronic migraine, sequela of remote insult, chronic small vessel ischemic disease, or    demyelinating process.    3.  Moderate paranasal sinus disease and left frontal air fluid level, nonspecific, can be seen with acute sinusitis in the appropriate clinical setting.      HEAD MRA:    1.  No aneurysm, high flow AVM or significant stenosis identified.   2.  Variant [place] anatomy as above.      NECK MRA:   1.  Normal neck MRA.      MRA Neck (Carot[place]) wo & w Contrast   Final Result   IMPRESSION:   HEAD MRI:    1.  No acute intracranial pathology  or abnormal intracranial enhancement.   2.  Nonspecific scattered white matter T2/FLAIR signal abnormality is noted which mildly prominent for patients age. Findings are nonspecific and could be seen with chronic migraine, sequela of remote insult, chronic small vessel ischemic disease, or    demyelinating process.    3.  Moderate paranasal sinus disease and left frontal air fluid level, nonspecific, can be seen with acute sinusitis in the appropriate clinical setting.      HEAD MRA:    1.  No aneurysm, high flow AVM or significant stenosis identified.   2.  Variant [place] anatomy as above.      NECK MRA:   1.  Normal neck MRA.      MR Brain w/o & w Contrast   Final Result   IMPRESSION:   HEAD MRI:    1.  No acute intracranial pathology or abnormal intracranial enhancement.   2.  Nonspecific scattered white matter T2/FLAIR signal abnormality is noted which mildly prominent for patients age. Findings are nonspecific and could be seen with chronic migraine, sequela of remote insult, chronic small vessel ischemic disease, or    demyelinating process.    3.  Moderate paranasal sinus disease and left frontal air fluid level, nonspecific, can be seen with acute sinusitis in the appropriate clinical setting.      HEAD MRA:    1.  No aneurysm, high flow AVM or significant stenosis identified.   2.  Variant [place] anatomy as above.      NECK MRA:   1.  Normal neck MRA.      CT Head w/o Contrast   Final Result   IMPRESSION:   1.  No intracranial hemorrhage, mass lesions, hydrocephalus or CT evidence for an acute infarct.   2.  Small partially calcified nodule in the floor of the fourth ventricle, nonspecific. Favor to reflect choroid plexus calcification.   3.  Mild to moderate mucosal thickening of the ethmoid air cells. Mild chronic mucosal thickening of the right maxillary sinus.       CTA  ANGIOGRAM CORONARY ARTERY    (Results Pending)   US Lower Extremity Venous Duplex Bilateral    (Results Pending)           Medical Decision Making  The patient's presentation is strongly suggestive of an acute illness with systemic symptoms and an acute health issue posing potential threat to life or bodily function.    The patient's evaluation involved:  review of external note(s) from 2 sources (see separate area of note for details)  ordering and/or review of 3+ test(s) in this encounter (see separate area of note for details)  review of 2 test result(s) ordered prior to this encounter (see separate area of note for details)  discussion of management or test interpretation with another health professional (see separate area of note for details)    The patient's management involved prescription drug management (including medications given in the ED) and a decision regarding hospitalization.      Assessment & Plan   42-year-old female history of some migraine headaches takes Tylenol presented with headache but also 3 days of some vertigo without hearing loss no tinnitus no history Ménière's.  Some ataxia but no dysarthria no other neurofocal findings identified.  Symptoms worse with head movement that may be more benign positional peripheral vertigo.  Patient in the ER head CT was negative EKG work-up otherwise negative she reports some chest pressure also palpitations troponin BNP negative other labs stable.  With CT negative MRI scan stroke protocol was done there are some nonspecific findings question of white matter changes slightly more prominent for age etc.  With the headaches may be more migraine related no other major concerns discussed with Dr. Lewis neurology also patient can follow-up as outpatient if needed.  Here in the ER patient given meclizine Zofran IV fluids etc. mild hyponatremia.  To be admitted to ED observation for chest pain rule out palpitations benign positional vertigo evaluation and treatment by PT also.  Patient agrees with plan.       I have reviewed the nursing notes. I have reviewed the  findings, diagnosis, plan and need for follow up with the patient.    Current Discharge Medication List          Final diagnoses:   Benign paroxysmal positional vertigo, bilateral   Chest pain, unspecified type   Palpitations   Chronic nonintractable headache, unspecified headache type       Osman Waterman  [facility] EMERGENCY DEPARTMENT  2/12/2023    This note was created at least in part by the use of dragon voice dictation system. Inadvertent typographical errors may still exist.  Osman Waterman MD.    Patient evaluated in the emergency department during the COVID-19 pandemic period. Careful attention to patients safety was addressed throughout the evaluation. Evaluation and treatment management was initiated with disposition made efficiently and appropriate as possible to minimize any risk of potential exposure to patient during this evaluation.       Osman Waterman MD  02/12/23 0275

## 2023-02-13 ENCOUNTER — APPOINTMENT (OUTPATIENT)
Dept: CT IMAGING | Facility: CLINIC | Age: 43
End: 2023-02-13
Attending: NURSE PRACTITIONER
Payer: COMMERCIAL

## 2023-02-13 ENCOUNTER — APPOINTMENT (OUTPATIENT)
Dept: ULTRASOUND IMAGING | Facility: CLINIC | Age: 43
End: 2023-02-13
Attending: NURSE PRACTITIONER
Payer: COMMERCIAL

## 2023-02-13 LAB
ANION GAP SERPL CALCULATED.3IONS-SCNC: 10 MMOL/L (ref 7–15)
BASOPHILS # BLD AUTO: 0 10E3/UL (ref 0–0.2)
BASOPHILS NFR BLD AUTO: 1 %
BUN SERPL-MCNC: 15.1 MG/DL (ref 6–20)
CALCIUM SERPL-MCNC: 8.4 MG/DL (ref 8.6–10)
CHLORIDE SERPL-SCNC: 107 MMOL/L (ref 98–107)
CREAT SERPL-MCNC: 0.87 MG/DL (ref 0.51–0.95)
DEPRECATED HCO3 PLAS-SCNC: 21 MMOL/L (ref 22–29)
EOSINOPHIL # BLD AUTO: 0.3 10E3/UL (ref 0–0.7)
EOSINOPHIL NFR BLD AUTO: 7 %
ERYTHROCYTE [DISTWIDTH] IN BLOOD BY AUTOMATED COUNT: 12.6 % (ref 10–15)
GFR SERPL CREATININE-BSD FRML MDRD: 84 ML/MIN/1.73M2
GLUCOSE SERPL-MCNC: 87 MG/DL (ref 70–99)
HCT VFR BLD AUTO: 40 % (ref 35–47)
HGB BLD-MCNC: 12.9 G/DL (ref 11.7–15.7)
IMM GRANULOCYTES # BLD: 0 10E3/UL
IMM GRANULOCYTES NFR BLD: 0 %
LYMPHOCYTES # BLD AUTO: 1.9 10E3/UL (ref 0.8–5.3)
LYMPHOCYTES NFR BLD AUTO: 44 %
MCH RBC QN AUTO: 31.3 PG (ref 26.5–33)
MCHC RBC AUTO-ENTMCNC: 32.3 G/DL (ref 31.5–36.5)
MCV RBC AUTO: 97 FL (ref 78–100)
MONOCYTES # BLD AUTO: 0.4 10E3/UL (ref 0–1.3)
MONOCYTES NFR BLD AUTO: 9 %
NEUTROPHILS # BLD AUTO: 1.6 10E3/UL (ref 1.6–8.3)
NEUTROPHILS NFR BLD AUTO: 39 %
NRBC # BLD AUTO: 0 10E3/UL
NRBC BLD AUTO-RTO: 0 /100
PLATELET # BLD AUTO: 235 10E3/UL (ref 150–450)
POTASSIUM SERPL-SCNC: 4.2 MMOL/L (ref 3.4–5.3)
RBC # BLD AUTO: 4.12 10E6/UL (ref 3.8–5.2)
SODIUM SERPL-SCNC: 138 MMOL/L (ref 136–145)
WBC # BLD AUTO: 4.2 10E3/UL (ref 4–11)

## 2023-02-13 PROCEDURE — 250N000013 HC RX MED GY IP 250 OP 250 PS 637: Performed by: STUDENT IN AN ORGANIZED HEALTH CARE EDUCATION/TRAINING PROGRAM

## 2023-02-13 PROCEDURE — 250N000013 HC RX MED GY IP 250 OP 250 PS 637: Performed by: PHYSICIAN ASSISTANT

## 2023-02-13 PROCEDURE — 93970 EXTREMITY STUDY: CPT

## 2023-02-13 PROCEDURE — 250N000011 HC RX IP 250 OP 636: Performed by: STUDENT IN AN ORGANIZED HEALTH CARE EDUCATION/TRAINING PROGRAM

## 2023-02-13 PROCEDURE — G0378 HOSPITAL OBSERVATION PER HR: HCPCS

## 2023-02-13 PROCEDURE — 75574 CT ANGIO HRT W/3D IMAGE: CPT | Mod: 26 | Performed by: STUDENT IN AN ORGANIZED HEALTH CARE EDUCATION/TRAINING PROGRAM

## 2023-02-13 PROCEDURE — 82310 ASSAY OF CALCIUM: CPT | Performed by: PHYSICIAN ASSISTANT

## 2023-02-13 PROCEDURE — 99221 1ST HOSP IP/OBS SF/LOW 40: CPT | Mod: 25 | Performed by: INTERNAL MEDICINE

## 2023-02-13 PROCEDURE — 85004 AUTOMATED DIFF WBC COUNT: CPT | Performed by: PHYSICIAN ASSISTANT

## 2023-02-13 PROCEDURE — 250N000009 HC RX 250: Performed by: STUDENT IN AN ORGANIZED HEALTH CARE EDUCATION/TRAINING PROGRAM

## 2023-02-13 PROCEDURE — 75574 CT ANGIO HRT W/3D IMAGE: CPT

## 2023-02-13 PROCEDURE — 36415 COLL VENOUS BLD VENIPUNCTURE: CPT | Performed by: PHYSICIAN ASSISTANT

## 2023-02-13 PROCEDURE — 93970 EXTREMITY STUDY: CPT | Mod: 26 | Performed by: RADIOLOGY

## 2023-02-13 PROCEDURE — 96376 TX/PRO/DX INJ SAME DRUG ADON: CPT

## 2023-02-13 RX ORDER — DIAZEPAM 2 MG
2 TABLET ORAL 2 TIMES DAILY PRN
Status: DISCONTINUED | OUTPATIENT
Start: 2023-02-13 | End: 2023-02-14 | Stop reason: HOSPADM

## 2023-02-13 RX ORDER — IOPAMIDOL 755 MG/ML
120 INJECTION, SOLUTION INTRAVASCULAR ONCE
Status: COMPLETED | OUTPATIENT
Start: 2023-02-13 | End: 2023-02-13

## 2023-02-13 RX ORDER — NITROGLYCERIN 0.4 MG/1
.4-.8 TABLET SUBLINGUAL
Status: DISCONTINUED | OUTPATIENT
Start: 2023-02-13 | End: 2023-02-13

## 2023-02-13 RX ORDER — METOPROLOL TARTRATE 1 MG/ML
5-15 INJECTION, SOLUTION INTRAVENOUS
Status: DISCONTINUED | OUTPATIENT
Start: 2023-02-13 | End: 2023-02-13

## 2023-02-13 RX ADMIN — DIAZEPAM 2 MG: 2 TABLET ORAL at 22:09

## 2023-02-13 RX ADMIN — FAMOTIDINE 40 MG: 20 TABLET, FILM COATED ORAL at 22:09

## 2023-02-13 RX ADMIN — MECLIZINE HYDROCHLORIDE 25 MG: 25 TABLET ORAL at 14:05

## 2023-02-13 RX ADMIN — MECLIZINE HYDROCHLORIDE 25 MG: 25 TABLET ORAL at 06:15

## 2023-02-13 RX ADMIN — NITROGLYCERIN 0.8 MG: 0.4 TABLET SUBLINGUAL at 10:00

## 2023-02-13 RX ADMIN — ASPIRIN 81 MG: 81 TABLET ORAL at 08:07

## 2023-02-13 RX ADMIN — ACETAMINOPHEN 650 MG: 325 TABLET ORAL at 22:12

## 2023-02-13 RX ADMIN — IOPAMIDOL 120 ML: 755 INJECTION, SOLUTION INTRAVENOUS at 09:52

## 2023-02-13 RX ADMIN — METOPROLOL TARTRATE 10 MG: 5 INJECTION INTRAVENOUS at 10:00

## 2023-02-13 RX ADMIN — MECLIZINE HYDROCHLORIDE 25 MG: 25 TABLET ORAL at 22:09

## 2023-02-13 ASSESSMENT — ACTIVITIES OF DAILY LIVING (ADL)
ADLS_ACUITY_SCORE: 31

## 2023-02-13 NOTE — PLAN OF CARE
"Outpatient/Observation goals to be met before discharge home:   BP (!) 125/91 (BP Location: Left arm)   Pulse 56   Temp 98.3  F (36.8  C)   Resp 15   Ht 1.575 m (5' 2\")   Wt 94.8 kg (209 lb)   SpO2 100%   BMI 38.23 kg/m      - Serial troponins and stress test complete.: Met  - Seen and cleared by consultant if applicable: Not met  - Adequate pain control on oral analgesia: Met  - Vital signs normal or at patient baseline: Met  - Safe disposition plan has been identified: Not met    "

## 2023-02-13 NOTE — PROGRESS NOTES
"Emergency Medicine Observation Attending note    The patient was independently seen and examined by me. The chart, vital signs, and labs were reviewed. The patient's findings were discussed with the NAY on the observation unit, and I agree with the findings of the note and the plan.    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 No fever. She also reported having some chest pressure and palpitations for a few days. No GI symptoms, no cough. No leg pain or swelling. She did have an MRI in the ED, which revealed non specific white matter T2/FLAIR signal abnormality which is mildly prominent for patient's age. This was discussed with neurology who felt it was possibly secondary to history of migraines and that no further testing was warranted.MRA of the brain and neck were unremarkable. No acute ischemic change was noted on EKG in the ED, and basic labs were not remarkable. She was admitted for ACS rule out and sx control. This morning she reports ongoing dizziness when she tried to sit up in bed. She also reported some mild chest pressure. No shortness of breath.     /80 (BP Location: Right arm)   Pulse 68   Temp 98.1  F (36.7  C) (Oral)   Resp 16   Ht 1.575 m (5' 2\")   Wt 94.8 kg (209 lb)   SpO2 99%   BMI 38.23 kg/m        Exam:  General: awake, alert, NAD  HEENT: NC/AT  Neck: supple  Lungs: CTA-B  Heart: RRR, no M/R/G  Abd: soft, ND/NT  Ext: no deformity. Strength and sensation nl and equal bilaterally      Assessment/plan:  1. Vertigo - ongoing, with movment. Sx medication management and PT consult.   2. Chest pain - intermittent, ongoing. EKG " non-ischemic. CXR neg for acute abnormality. Will plan for CT angiogram today.

## 2023-02-13 NOTE — PROGRESS NOTES
Observation Goals:   - Serial troponins and stress test complete: Not met, stress test not complete   - Seen and cleared by consultant if applicable: Not met, cardiology consult pending   - Adequate pain control on oral analgesia: Met   - Vital signs normal or at patient baseline: Met   - Safe disposition plan has been identified: Not met    Nurse to notify provider when observation goals have been met and patient is ready for discharge.

## 2023-02-13 NOTE — PROGRESS NOTES
"ED OBSERVATION PROGRESS NOTE:  S:Radha Loya is a 42-year-old female with medical history of patent foramen overall and patent ductus arteriosus, bradycardia with possible junctional rhythm in 2018, GERD, vitamin D deficiency, headaches, allergic rhinitis, , ASCUS with positive high risk HPV cervical, and low back pain who presented to [facility] ED on 23 with dizziness and chest pain.    Chief Complaint   Patient presents with     Dizziness     Patient presents due to dizziness, lightheaded and chest pain; present for 3 days. Patient reports 5/10 sharp pain medial chest.      1. Benign paroxysmal positional vertigo, bilateral    2. Chest pain, unspecified type    3. Palpitations    4. Chronic nonintractable headache, unspecified headache type        Problem List:  There is no problem list on file for this patient.      MEDS:   No current outpatient medications on file.       ALLERGIES:    Allergies   Allergen Reactions     Chloroquine      Taken in [place]       O:BP (!) 125/91 (BP Location: Left arm)   Pulse 56   Temp 98.3  F (36.8  C)   Resp 15   Ht 1.575 m (5' 2\")   Wt 94.8 kg (209 lb)   SpO2 100%   BMI 38.23 kg/m    Physical Exam   Constitutional: Pt is oriented to person, place, and time.Pt appears well-developed and well-nourished.   HENT:   Head: Normocephalic and atraumatic.   Eyes: Conjunctivae are normal. Pupils are equal, round, and reactive to light.   Neck: Normal range of motion. Neck supple.   Cardiovascular: Normal rate, regular rhythm, normal heart sounds and intact distal pulses.    Pulmonary/Chest: Effort normal and breath sounds normal. No respiratory distress. Pt has no wheezes. Pt has no rales  Abdominal: Soft. Bowel sounds are normal. Pt exhibits no distension and no mass. No tenderness. Pt has no rebound and no guarding.   Musculoskeletal: Normal range of motion. Pt exhibits no edema.   Neurological: Pt is alert and oriented to person, place, and time. Normal " "reflexes.   Skin: Skin is warm and dry. No rash noted.   Psychiatric: Pt has a normal mood and affect. Behavior is normal. Judgment and thought content normal.       Assessment & Plan     Radha Loya is a 42-year-old female with medical history of patent foramen overall and patent ductus arteriosus, bradycardia with possible junctional rhythm in 2018, GERD, vitamin D deficiency, headaches, allergic rhinitis, , ASCUS with positive high risk HPV cervical, and low back pain who presented to [facility] ED on 23 with dizziness and chest pain.     #Dizziness  #History of headaches  Patient endorses dizziness \"room spinning\" sensation x 3 days. She has had this once before in the past but it was self limited, no formal diagnosis of BPPV. She has an associated headache for which she has been taking Tylenol. No tinnitus or hearing loss. No recent URI symptoms. She has had some associated nausea with the dizziness. Her gait has been somewhat ataxic. In the ED, VSS. CBC unremarkable, CMP with sodium of 130. CRP 9. Normal TSH. MRI brain with no acute pathology. There is non specific white matter T2/FLAIR signal abnormality which is mildly prominent for patient's age. This was discussed with neurology who felt it was possibly secondary to history of migraines and that no further testing was warranted. If patient continues to have issues with headaches in the future can be referred to outpatient neuro. MRA brain and neck were unremarkable.    Patient still feels dizzy. She was unable to complete PT assessment due dizzy symptoms. Will attempt BPPV PT  assessment in the morning. Started her on low dose diazepam 2 mg BID prn for additional symptom control. Will continue to monitor.   -Scheduled Meclizine  -Diazepam 2 mg BID prn  -IV NS @ 75 mL/hr  -PT evaluation  -1 dose of IV Benadryl given with meclizine and antiemetics  -Antiemetics as needed     #Chest pain  #History of patent foramen overall and patent ductus " arteriosus  #History of bradycardia with possible junctional rhythm in 2018  Patient also endorses chest pain which is sharp and mid sternal. Pain is rated a 5/10 in intensity. No associated dyspnea or diaphoresis. Pain does not radiate. No improvement with GI cocktail in the ED. The patient had exercise a stress test in 2006 or 2007 which she was diagnosed with patent foramen overall and patent ductus arteriosus.  The patient was told she does not need surgery and her heart is stable, but she has not had any follow-up since.  EKG sinus rhythm with sinus arrhythmia. Troponin <6. Pro-BNP <36. CXR NAD.  Case was discussed with cardiology who recommended CT angiogram which shows widely patent coronary arteries without evidence of  atherosclerosis or stenosis. Per cardiology, no further cardiac evaluation indicated and patient was cleared for discharge from cardiac standpoint.   -Continuous telemetry  -Continue ASA  -Nitroglycerin as needed     #R lower extremity pain  -Lower extremity ultrasound bilaterally to rule out DVT is negative.      #Hyponatremia  Sodium 130 on admission, treated with IV NS 1 L bolus. Recheck Na 138     #GERD  -Continue with home Pepcid 40 mg at bedtime        Diet:  NPO after midnight   DVT Prophylaxis: Anticipating a short admission, encouraged patient to ambulate as much as possible with her dizziness  Code Status: Full code     Signed:  Marilia Felder PA-C  February 13, 2023 at 5:04 PM

## 2023-02-13 NOTE — PROGRESS NOTES
"Observation Goals:   - Serial troponins and stress test complete: Not met, stress test not complete   - Seen and cleared by consultant if applicable: Not met, cardiology consult pending   - Adequate pain control on oral analgesia: Met   - Vital signs normal or at patient baseline: Met   - Safe disposition plan has been identified: Not met    Nurse to notify provider when observation goals have been met and patient is ready for discharge.     /77 (BP Location: Left arm, Patient Position: Supine)   Pulse 61   Temp 97.8  F (36.6  C) (Oral)   Resp 18   Ht 1.575 m (5' 2\")   Wt 94.8 kg (209 lb)   SpO2 99%   BMI 38.23 kg/m      "

## 2023-02-13 NOTE — PLAN OF CARE
Goal Outcome Evaluation:     Plan of Care Reviewed With: patient   Overall Patient Progress: no change    Time: 2025-0730  Neuros: A&O x4, pleasant.   Cardiac: Telemetry in place, pt presents with normal sinus rhythm.  Respiratory: WDL, stable on room air.  GI/: Voids spontaneously. Last bowel movement prior to admission, 2/11.  Diet/Nausea: Tolerating NPO diet, NPO since 0000. Pt denies nausea but reports constant dizziness. Pt given scheduled meclizine and PRN zofran for dizziness.  Skin: No apparent skin issues noted.   LDA: Left PIV infusing NS @ 75mL/hr.   Labs: Reviewed  Pain: Pt complains of slight headache along with chest pain, tylenol and aspirin given at [place], EKG completed.  Activity: Stand-by assist.  New changes this shift: PT transferred from [place] to [facility] around 2025.  Plan: CTA angiogram today. Then possibly ECHO to follow.

## 2023-02-13 NOTE — PROGRESS NOTES
Called by ED about this patients MRI.  There are a few scatter T2 hyperintensities.  Most are small and not juxtacortical/periventricular to suggest demyelination.  Per ED she does have a history of migraines and is there for migrains and vertigo. In the absence of focal neurologic symptoms or typical lesions do not think requires work up at this time.  These can be seen in migraines.  If continues to have difficulty with migraine control or develops focal symptoms should have referral to neruology.    Edilma Lewis, DO

## 2023-02-14 ENCOUNTER — APPOINTMENT (OUTPATIENT)
Dept: PHYSICAL THERAPY | Facility: CLINIC | Age: 43
End: 2023-02-14
Attending: PHYSICIAN ASSISTANT
Payer: COMMERCIAL

## 2023-02-14 VITALS
WEIGHT: 209 LBS | TEMPERATURE: 97.8 F | OXYGEN SATURATION: 100 % | DIASTOLIC BLOOD PRESSURE: 83 MMHG | BODY MASS INDEX: 38.46 KG/M2 | RESPIRATION RATE: 16 BRPM | HEART RATE: 64 BPM | HEIGHT: 62 IN | SYSTOLIC BLOOD PRESSURE: 117 MMHG

## 2023-02-14 LAB
ANION GAP SERPL CALCULATED.3IONS-SCNC: 9 MMOL/L (ref 7–15)
BUN SERPL-MCNC: 16.9 MG/DL (ref 6–20)
CALCIUM SERPL-MCNC: 8.6 MG/DL (ref 8.6–10)
CHLORIDE SERPL-SCNC: 106 MMOL/L (ref 98–107)
CREAT SERPL-MCNC: 0.86 MG/DL (ref 0.51–0.95)
DEPRECATED HCO3 PLAS-SCNC: 21 MMOL/L (ref 22–29)
ERYTHROCYTE [DISTWIDTH] IN BLOOD BY AUTOMATED COUNT: 12.3 % (ref 10–15)
GFR SERPL CREATININE-BSD FRML MDRD: 85 ML/MIN/1.73M2
GLUCOSE SERPL-MCNC: 96 MG/DL (ref 70–99)
HCT VFR BLD AUTO: 40.3 % (ref 35–47)
HGB BLD-MCNC: 12.7 G/DL (ref 11.7–15.7)
MCH RBC QN AUTO: 30.8 PG (ref 26.5–33)
MCHC RBC AUTO-ENTMCNC: 31.5 G/DL (ref 31.5–36.5)
MCV RBC AUTO: 98 FL (ref 78–100)
PLATELET # BLD AUTO: 246 10E3/UL (ref 150–450)
POTASSIUM SERPL-SCNC: 4 MMOL/L (ref 3.4–5.3)
RBC # BLD AUTO: 4.13 10E6/UL (ref 3.8–5.2)
SODIUM SERPL-SCNC: 136 MMOL/L (ref 136–145)
WBC # BLD AUTO: 5.3 10E3/UL (ref 4–11)

## 2023-02-14 PROCEDURE — 85027 COMPLETE CBC AUTOMATED: CPT | Performed by: PHYSICIAN ASSISTANT

## 2023-02-14 PROCEDURE — 97162 PT EVAL MOD COMPLEX 30 MIN: CPT | Mod: GP

## 2023-02-14 PROCEDURE — G0378 HOSPITAL OBSERVATION PER HR: HCPCS

## 2023-02-14 PROCEDURE — 80048 BASIC METABOLIC PNL TOTAL CA: CPT | Performed by: PHYSICIAN ASSISTANT

## 2023-02-14 PROCEDURE — 95992 CANALITH REPOSITIONING PROC: CPT | Mod: GP

## 2023-02-14 PROCEDURE — 250N000013 HC RX MED GY IP 250 OP 250 PS 637: Performed by: PHYSICIAN ASSISTANT

## 2023-02-14 PROCEDURE — 36415 COLL VENOUS BLD VENIPUNCTURE: CPT | Performed by: PHYSICIAN ASSISTANT

## 2023-02-14 PROCEDURE — 258N000003 HC RX IP 258 OP 636: Performed by: PHYSICIAN ASSISTANT

## 2023-02-14 PROCEDURE — 97530 THERAPEUTIC ACTIVITIES: CPT | Mod: GP,59

## 2023-02-14 RX ORDER — MECLIZINE HYDROCHLORIDE 25 MG/1
25 TABLET ORAL 3 TIMES DAILY PRN
Qty: 15 TABLET | Refills: 0 | Status: SHIPPED | OUTPATIENT
Start: 2023-02-14 | End: 2023-02-19

## 2023-02-14 RX ADMIN — ASPIRIN 81 MG: 81 TABLET ORAL at 07:47

## 2023-02-14 RX ADMIN — MECLIZINE HYDROCHLORIDE 25 MG: 25 TABLET ORAL at 06:03

## 2023-02-14 RX ADMIN — SODIUM CHLORIDE: 9 INJECTION, SOLUTION INTRAVENOUS at 03:54

## 2023-02-14 ASSESSMENT — ACTIVITIES OF DAILY LIVING (ADL)
ADLS_ACUITY_SCORE: 31

## 2023-02-14 NOTE — PLAN OF CARE
Physical Therapy Discharge Summary    Reason for therapy discharge:    All goals and outcomes met, no further needs identified.    Progress towards therapy goal(s). See goals on Care Plan in [facility] electronic health record for goal details.  Goals met    Therapy recommendation(s):    Continued therapy is recommended.  Rationale/Recommendations:  Pt would benefit from OP Vestibular PT to manage long term vestibular symptoms and decrease risk of recurrence.

## 2023-02-14 NOTE — PROGRESS NOTES
"   23 1000   Appointment Info   Signing Clinician's Name / Credentials (PT) Tracey Ventura DPT   Quick Adds   Quick Adds Vestibular Eval;Certification   Living Environment   People in Home spouse;child(alessandro), dependent   Home Accessibility no concerns   Transportation Anticipated car, drives self   Living Environment Comments Pt lives with  and 3 young children. 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 No fever. She also reported having some chest pressure and palpitations for a few days. No GI symptoms, no cough. No leg pain or swelling. She did have an MRI in the ED, which revealed non specific white matter T2/FLAIR signal abnormality which is mildly prominent for patient's age. This was discussed with neurology who felt it was possibly secondary to history of migraines and that no further testing was warranted.MRA of the brain and neck were unremarkable. No acute ischemic change was noted on EKG in the ED, and basic labs were not remarkable. She was admitted for ACS rule out and sx control. This morning she reports that the dizziness is still there, but moving in the right direction. Chest discomfort is " better.   Cognition   Affect/Mental Status (Cognition) sad/depressed affect   Orientation Status (Cognition) oriented x 4   Follows Commands (Cognition) WFL   Pain Assessment   Patient Currently in Pain Yes, see Vital Sign flowsheet   Integumentary/Edema   Integumentary/Edema no deficits were identifed   Posture    Posture Forward head position;Protracted shoulders   Range of Motion (ROM)   Range of Motion ROM is WFL   Strength (Manual Muscle Testing)   Strength (Manual Muscle Testing) strength is WFL   Bed Mobility   Bed Mobility no deficits identified   Comment, (Bed Mobility) IND   Transfers   Transfers no deficits identified   Comment, (Transfers) IND   Gait/Stairs (Locomotion)   Comment, (Gait/Stairs) IND. Stairs not assessed at eval.   Balance   Balance Comments Mild impairment 2/2 dizziness   Sensory Examination   Sensory Perception patient reports no sensory changes   Muscle Tone   Muscle Tone no deficits were identified   Infrared Goggle Exam or Frenzel Lense Exam   Reina-Hallpike (Right) Upbeating R torsional   Reina-Hallpike (Right) Comments Performed 2x. During first trial, pt with significant increase in symptoms, experiencing difficulty speaking and unable to maintain open eyes.   Reina-Hallpike (Left) Other  (Increase in symptoms but no obvious nystagmus on L)   Clinical Impression   Criteria for Skilled Therapeutic Intervention Yes, treatment indicated   PT Diagnosis (PT) BPPV   Functional limitations due to impairments functional endurance   Clinical Presentation (PT Evaluation Complexity) Evolving/Changing   Clinical Presentation Rationale Clinical judgement   Clinical Decision Making (Complexity) moderate complexity   Planned Therapy Interventions (PT) balance training;bed mobility training;gait training;neuromuscular re-education;patient/family education;postural re-education;strengthening;transfer training;progressive activity/exercise;risk factor education;home program guidelines   Risk & Benefits of  therapy have been explained evaluation/treatment results reviewed;care plan/treatment goals reviewed;risks/benefits reviewed;current/potential barriers reviewed;participants voiced agreement with care plan;participants included;patient   PT Total Evaluation Time   PT Eval, Moderate Complexity Minutes (82822) 12   Therapy Certification   Start of care date 02/14/23   Certification date from 02/14/23   Certification date to 02/21/23   Medical Diagnosis BPPV   Physical Therapy Goals   PT Frequency One time eval and treatment only   PT Predicted Duration/Target Date for Goal Attainment 02/28/23   PT Goals Bed Mobility;Transfers;Gait;PT Goal 1   PT: Bed Mobility Independent;Supine to/from sit;Rolling;Goal Met   PT: Transfers Independent;Sit to/from stand;Goal Met   PT: Gait Independent;50 feet;Goal Met   Therapeutic Activity   Therapeutic Activities: dynamic activities to improve functional performance Minutes (32480) 16   Treatment Detail/Skilled Intervention Pt greeted seated in bed, agreeable to therapy. Time spent educating pt on Reina Hallpike and Epley maneuver in the context of anticipated BPPV diagnosis. Educated on inner ear anatomy and its influence on positional awareness. Educated pt on outpatient vestibular therapy and anticipated treatment. Pt IND for supine<>sit and sit<>stand, noting manageable symptoms with this. Provided SBA-modIND for ambulation to/from bathroom, ~50'. Left with all needs met at PT departure.   Canalith Repositioning   Canalith Repositioning Minutes (20099) 8   Symptoms Noted During/After Treatment dizziness   Treatment Detail/Skilled Intervention Performed Epley maneuver bilaterally with mild improvement in symptoms and complete resolution of nystagmus on R side.   PT Discharge Planning   PT Plan DC   PT Discharge Recommendation (DC Rec) home with outpatient physical therapy   PT Rationale for DC Rec Pt is near baseline mobility, limited significantly by dizziness. Pt demo'ing some  improvement following Epley maneuver. Pt would benefit from outpatient vestibular therapy for longterm management of vestibular symptoms.   PT Brief overview of current status IND with all mobility   Total Session Time   Timed Code Treatment Minutes 16   Total Session Time (sum of timed and untimed services) 36

## 2023-02-14 NOTE — PLAN OF CARE
"Observation Goals:   - Serial troponins and stress test complete - Met   - Seen and cleared by consultant if applicable - Not met. Cleared by Cardiology. Pt to be evaluation for BPPV today by Physical Therapy.   - Adequate pain control on oral analgesia - Met   - Vital signs normal or at patient baseline - Met  /73 (BP Location: Right arm)   Pulse 62   Temp 97.6  F (36.4  C) (Oral)   Resp 16   Ht 1.575 m (5' 2\")   Wt 94.8 kg (209 lb)   SpO2 98%   BMI 38.23 kg/m    - Safe disposition plan has been identified - Not met  "

## 2023-02-14 NOTE — PLAN OF CARE
Goal Outcome Evaluation:  - Serial troponins and stress test complete: Met   - Seen and cleared by consultant if applicable: Not met   - Adequate pain control on oral analgesia: Met   - Vital signs normal or at patient baseline: Met   - Safe disposition plan has been identified: Not met

## 2023-02-14 NOTE — DISCHARGE SUMMARY
Discharge Summary    Radha Loya MRN# [id number]   YOB: 1980 Age: 42 year old     Date of Admission:  2/12/2023  Date of Discharge:  No discharge date for patient encounter.  Admitting Physician:  Reshma Rosario PA-C  Discharge Physician:  Marilia Felder PA-C  Discharging Service:  Internal Medicine     Primary Provider: [facility], [facility]          Discharge Diagnosis:   Chest pain, resolved   Vertigo              Discharge Disposition:   Discharged to home           Condition on Discharge:   Discharge condition: Stable   Code status on discharge: Full Code           Procedures:   Imaging performed:   Head CT     Cardiology procedures perfromed:   CTA              Discharge Medications:     Current Discharge Medication List      START taking these medications    Details   meclizine (ANTIVERT) 25 MG tablet Take 1 tablet (25 mg) by mouth 3 times daily as needed for dizziness  Qty: 15 tablet, Refills: 0    Associated Diagnoses: Benign paroxysmal positional vertigo, bilateral         CONTINUE these medications which have NOT CHANGED    Details   acetaminophen (TYLENOL) 325 MG tablet Take 325-650 mg by mouth every 6 hours as needed for mild pain      famotidine (PEPCID) 40 MG tablet Take 1 tablet (40 mg) by mouth At Bedtime  Qty: 30 tablet, Refills: 1      ibuprofen (ADVIL/MOTRIN) 600 MG tablet Take 1 tablet (600 mg) by mouth every 6 hours as needed for moderate pain  Qty: 30 tablet, Refills: 0      vitamin B-12 (CYANOCOBALAMIN) 1000 MCG tablet Take 1,000 mcg by mouth      vitamin D3 (CHOLECALCIFEROL) 1.25 MG (54551 UT) capsule Take 50,000 Units by mouth                   Consultations:   Consultation during this admission received from cardiology             Brief History of Illness:   Radha Loya is a 42-year-old female with medical history of patent foramen overall and patent ductus arteriosus, bradycardia with possible junctional rhythm in 2018, GERD, vitamin D deficiency,  "headaches, allergic rhinitis, , ASCUS with positive high risk HPV cervical, and low back pain who presented to [facility] ED on 23 with dizziness and chest pain.          Hospital Course:   #Dizziness  #History of headaches  Patient endorses dizziness \"room spinning\" sensation x 3 days. She has had this once before in the past but it was self limited, no formal diagnosis of BPPV. She has an associated headache for which she has been taking Tylenol. No tinnitus or hearing loss. No recent URI symptoms. She has had some associated nausea with the dizziness. Her gait has been somewhat ataxic. In the ED, VSS. CBC unremarkable, CMP with sodium of 130. CRP 9. Normal TSH. MRI brain with no acute pathology. There is non specific white matter T2/FLAIR signal abnormality which is mildly prominent for patient's age. This was discussed with neurology who felt it was possibly secondary to history of migraines and that no further testing was warranted. If patient continues to have issues with headaches in the future can be referred to outpatient neuro. MRA brain and neck were unremarkable. PT performed Epley maneuver bilaterally with mild improvement in symptoms and complete resolution of nystagmus on R side. Patient reports she is feeling much improved after the maneuver. Referral for vestibular therapy placed. Patient agreeable to discharge plan.  -Continue with Meclizine as needed   -Follow up with PT  -Follow up with PCP            #Chest pain  #History of patent foramen overall and patent ductus arteriosus  #History of bradycardia with possible junctional rhythm in 2018  Patient also endorses chest pain which is sharp and mid sternal. Pain is rated a 5/10 in intensity. No associated dyspnea or diaphoresis. Pain does not radiate. No improvement with GI cocktail in the ED. The patient had exercise a stress test in  or  which she was diagnosed with patent foramen overall and patent ductus " "arteriosus.  The patient was told she does not need surgery and her heart is stable, but she has not had any follow-up since.  EKG sinus rhythm with sinus arrhythmia. Troponin <6. Pro-BNP <36. CXR NAD.  Case was discussed with cardiology who recommended CT angiogram which shows widely patent coronary arteries without evidence of atherosclerosis or stenosis. Per cardiology, no further cardiac evaluation indicated and patient was cleared for discharge from cardiac standpoint.   -Continue ASA        #R lower extremity pain  -Lower extremity ultrasound bilaterally to rule out DVT is negative.      #Hyponatremia  Sodium 130 on admission, treated with IV NS 1 L bolus. Recheck Na 138     #GERD  -Continue with home Pepcid 40 mg at bedtime            Final Day of Progress before Discharge:       Physical Exam:  Blood pressure 117/83, pulse 64, temperature 97.8  F (36.6  C), temperature source Oral, resp. rate 16, height 1.575 m (5' 2\"), weight 94.8 kg (209 lb), SpO2 100 %.    EXAM:  Physical Exam   Constitutional: Pt is oriented to person, place, and time.Pt appears well-developed and well-nourished.   HENT:   Head: Normocephalic and atraumatic.   Eyes: Conjunctivae are normal. Pupils are equal, round, and reactive to light.   Neck: Normal range of motion. Neck supple.   Cardiovascular: Normal rate, regular rhythm, normal heart sounds and intact distal pulses.    Pulmonary/Chest: Effort normal and breath sounds normal. No respiratory distress. Pt has no wheezes. Pt has no rales  Abdominal: Soft. Bowel sounds are normal. Pt exhibits no distension and no mass. No tenderness. Pt has no rebound and no guarding.   Musculoskeletal: Normal range of motion. Pt exhibits no edema.   Neurological: Pt is alert and oriented to person, place, and time. Normal reflexes.   Skin: Skin is warm and dry. No rash noted.   Psychiatric: Pt has a normal mood and affect. Behavior is normal. Judgment and thought content normal.             Data:  All " laboratory data reviewed             Significant Results:   None  Results for orders placed or performed during the hospital encounter of 02/12/23   CT Head w/o Contrast     Status: None    Narrative    EXAM: CT HEAD W/O CONTRAST  LOCATION: [facility]  DATE/TIME: 2/12/2023 10:25 AM    INDICATION: ha and dizziness  COMPARISON: None.  TECHNIQUE: Routine CT Head without IV contrast. Multiplanar reformats. Dose reduction techniques were used.    FINDINGS:  INTRACRANIAL CONTENTS: No intracranial hemorrhage. Normal cerebral parenchymal volume. No midline shift. The basilar cisterns are patent. No CT evidence for an acute infarct. No cerebellar tonsillar ectopia. Small partially calcified nodule in the floor   of the fourth ventricle.    VISUALIZED ORBITS/SINUSES/MASTOIDS: No intraorbital abnormality. Mild to moderate mucosal thickening of the ethmoid air cells. Mild chronic mucosal thickening of the right maxillary sinus. No middle ear or mastoid effusion.    BONES/SOFT TISSUES: No acute abnormality. Prominent adenoid lymphoid tissue, likely reactive.      Impression    IMPRESSION:  1.  No intracranial hemorrhage, mass lesions, hydrocephalus or CT evidence for an acute infarct.  2.  Small partially calcified nodule in the floor of the fourth ventricle, nonspecific. Favor to reflect choroid plexus calcification.  3.  Mild to moderate mucosal thickening of the ethmoid air cells. Mild chronic mucosal thickening of the right maxillary sinus.    MR Brain w/o & w Contrast     Status: None    Narrative    EXAM: MR BRAIN W/O and W CONTRAST, MRA NECK (CAROTIDS) W/O and W CONTRAST, MRA BRAIN ([place]) W/O CONTRAST  LOCATION: [facility]  DATE/TIME: 2/12/2023 2:01 PM    INDICATION: Headache; Acute HA (< 3 months), no complicating features  COMPARISON: CT 02/12/2023  CONTRAST: 9.5ml gadovist  TECHNIQUE:   1) Routine multiplanar  multisequence head MRI without and with intravenous contrast.  2) 3D time-of-flight head MRA without intravenous contrast.  3) Neck MRA without and with IV contrast. Stenosis measurements made according to NASCET criteria unless otherwise specified.    FINDINGS:  HEAD MRI:  INTRACRANIAL CONTENTS: No acute or subacute infarct. No mass, acute hemorrhage, or extra-axial fluid collections. Scattered nonspecific foci of T2/FLAIR hyperintense signal in the cerebral white matter. There is no involvement of the brachium pontis,   anterior temporal white matter, or corpus callosum. Normal ventricles and sulci. Normal position of the cerebellar tonsils. No pathologic contrast enhancement. No corresponding MR signal abnormality or enhancement to the area of hyperattenuation in the   fourth ventricle on comparison head CT which likely represents calcified choroid plexus.    SELLA: No abnormality accounting for technique.    OSSEOUS STRUCTURES/SOFT TISSUES: Normal marrow signal. The major intracranial vascular flow voids are maintained.     ORBITS: No abnormality accounting for technique.     SINUSES/MASTOIDS: Moderate mucosal thickening scattered about the paranasal sinuses. A left frontal air fluid levels noted. No middle ear or mastoid effusion.     HEAD MRA:   ANTERIOR CIRCULATION: No stenosis/occlusion, aneurysm, or high flow vascular malformation. Fetal origin of the left posterior cerebral artery from the anterior circulation.    POSTERIOR CIRCULATION: No stenosis/occlusion, aneurysm, or high flow vascular malformation. Balanced vertebral arteries supply a normal basilar artery.     NECK MRA:   RIGHT CAROTID: No measurable stenosis or dissection.    LEFT CAROTID: No measurable stenosis or dissection.    VERTEBRAL ARTERIES: No focal stenosis or dissection. Balanced vertebral arteries.    AORTIC ARCH: Aortic arch is incompletely imaged. Suspect bovine anatomy of the arch. There is no significant stenosis in the visualized  great vessels.      Impression    IMPRESSION:  HEAD MRI:   1.  No acute intracranial pathology or abnormal intracranial enhancement.  2.  Nonspecific scattered white matter T2/FLAIR signal abnormality is noted which mildly prominent for patients age. Findings are nonspecific and could be seen with chronic migraine, sequela of remote insult, chronic small vessel ischemic disease, or   demyelinating process.   3.  Moderate paranasal sinus disease and left frontal air fluid level, nonspecific, can be seen with acute sinusitis in the appropriate clinical setting.    HEAD MRA:   1.  No aneurysm, high flow AVM or significant stenosis identified.  2.  Variant [place] anatomy as above.    NECK MRA:  1.  Normal neck MRA.   MRA Brain ([place]) wo Contrast     Status: None    Narrative    EXAM: MR BRAIN W/O and W CONTRAST, MRA NECK (CAROTIDS) W/O and W CONTRAST, MRA BRAIN ([place]) W/O CONTRAST  LOCATION: [facility]  DATE/TIME: 2/12/2023 2:01 PM    INDICATION: Headache; Acute HA (< 3 months), no complicating features  COMPARISON: CT 02/12/2023  CONTRAST: 9.5ml gadovist  TECHNIQUE:   1) Routine multiplanar multisequence head MRI without and with intravenous contrast.  2) 3D time-of-flight head MRA without intravenous contrast.  3) Neck MRA without and with IV contrast. Stenosis measurements made according to NASCET criteria unless otherwise specified.    FINDINGS:  HEAD MRI:  INTRACRANIAL CONTENTS: No acute or subacute infarct. No mass, acute hemorrhage, or extra-axial fluid collections. Scattered nonspecific foci of T2/FLAIR hyperintense signal in the cerebral white matter. There is no involvement of the brachium pontis,   anterior temporal white matter, or corpus callosum. Normal ventricles and sulci. Normal position of the cerebellar tonsils. No pathologic contrast enhancement. No corresponding MR signal abnormality or enhancement to the area of  hyperattenuation in the   fourth ventricle on comparison head CT which likely represents calcified choroid plexus.    SELLA: No abnormality accounting for technique.    OSSEOUS STRUCTURES/SOFT TISSUES: Normal marrow signal. The major intracranial vascular flow voids are maintained.     ORBITS: No abnormality accounting for technique.     SINUSES/MASTOIDS: Moderate mucosal thickening scattered about the paranasal sinuses. A left frontal air fluid levels noted. No middle ear or mastoid effusion.     HEAD MRA:   ANTERIOR CIRCULATION: No stenosis/occlusion, aneurysm, or high flow vascular malformation. Fetal origin of the left posterior cerebral artery from the anterior circulation.    POSTERIOR CIRCULATION: No stenosis/occlusion, aneurysm, or high flow vascular malformation. Balanced vertebral arteries supply a normal basilar artery.     NECK MRA:   RIGHT CAROTID: No measurable stenosis or dissection.    LEFT CAROTID: No measurable stenosis or dissection.    VERTEBRAL ARTERIES: No focal stenosis or dissection. Balanced vertebral arteries.    AORTIC ARCH: Aortic arch is incompletely imaged. Suspect bovine anatomy of the arch. There is no significant stenosis in the visualized great vessels.      Impression    IMPRESSION:  HEAD MRI:   1.  No acute intracranial pathology or abnormal intracranial enhancement.  2.  Nonspecific scattered white matter T2/FLAIR signal abnormality is noted which mildly prominent for patients age. Findings are nonspecific and could be seen with chronic migraine, sequela of remote insult, chronic small vessel ischemic disease, or   demyelinating process.   3.  Moderate paranasal sinus disease and left frontal air fluid level, nonspecific, can be seen with acute sinusitis in the appropriate clinical setting.    HEAD MRA:   1.  No aneurysm, high flow AVM or significant stenosis identified.  2.  Variant [place] anatomy as above.    NECK MRA:  1.  Normal neck MRA.   MRA Neck (Carotids) wo &  w Contrast     Status: None    Narrative    EXAM: MR BRAIN W/O and W CONTRAST, MRA NECK (CAROTIDS) W/O and W CONTRAST, MRA BRAIN ([place]) W/O CONTRAST  LOCATION: [facility]  DATE/TIME: 2/12/2023 2:01 PM    INDICATION: Headache; Acute HA (< 3 months), no complicating features  COMPARISON: CT 02/12/2023  CONTRAST: 9.5ml gadovist  TECHNIQUE:   1) Routine multiplanar multisequence head MRI without and with intravenous contrast.  2) 3D time-of-flight head MRA without intravenous contrast.  3) Neck MRA without and with IV contrast. Stenosis measurements made according to NASCET criteria unless otherwise specified.    FINDINGS:  HEAD MRI:  INTRACRANIAL CONTENTS: No acute or subacute infarct. No mass, acute hemorrhage, or extra-axial fluid collections. Scattered nonspecific foci of T2/FLAIR hyperintense signal in the cerebral white matter. There is no involvement of the brachium pontis,   anterior temporal white matter, or corpus callosum. Normal ventricles and sulci. Normal position of the cerebellar tonsils. No pathologic contrast enhancement. No corresponding MR signal abnormality or enhancement to the area of hyperattenuation in the   fourth ventricle on comparison head CT which likely represents calcified choroid plexus.    SELLA: No abnormality accounting for technique.    OSSEOUS STRUCTURES/SOFT TISSUES: Normal marrow signal. The major intracranial vascular flow voids are maintained.     ORBITS: No abnormality accounting for technique.     SINUSES/MASTOIDS: Moderate mucosal thickening scattered about the paranasal sinuses. A left frontal air fluid levels noted. No middle ear or mastoid effusion.     HEAD MRA:   ANTERIOR CIRCULATION: No stenosis/occlusion, aneurysm, or high flow vascular malformation. Fetal origin of the left posterior cerebral artery from the anterior circulation.    POSTERIOR CIRCULATION: No stenosis/occlusion, aneurysm, or high flow vascular  malformation. Balanced vertebral arteries supply a normal basilar artery.     NECK MRA:   RIGHT CAROTID: No measurable stenosis or dissection.    LEFT CAROTID: No measurable stenosis or dissection.    VERTEBRAL ARTERIES: No focal stenosis or dissection. Balanced vertebral arteries.    AORTIC ARCH: Aortic arch is incompletely imaged. Suspect bovine anatomy of the arch. There is no significant stenosis in the visualized great vessels.      Impression    IMPRESSION:  HEAD MRI:   1.  No acute intracranial pathology or abnormal intracranial enhancement.  2.  Nonspecific scattered white matter T2/FLAIR signal abnormality is noted which mildly prominent for patients age. Findings are nonspecific and could be seen with chronic migraine, sequela of remote insult, chronic small vessel ischemic disease, or   demyelinating process.   3.  Moderate paranasal sinus disease and left frontal air fluid level, nonspecific, can be seen with acute sinusitis in the appropriate clinical setting.    HEAD MRA:   1.  No aneurysm, high flow AVM or significant stenosis identified.  2.  Variant [place] anatomy as above.    NECK MRA:  1.  Normal neck MRA.   XR Chest 2 Views     Status: None    Narrative    EXAM: XR CHEST 2 VIEWS  LOCATION: [facility]  DATE/TIME: 2/12/2023 3:40 PM    INDICATION: Chest pain and palpitations  COMPARISON: None.      Impression    IMPRESSION: Negative chest.   US Lower Extremity Venous Duplex Bilateral     Status: None    Narrative    EXAMINATION: DOPPLER VENOUS ULTRASOUND OF BILATERAL LOWER EXTREMITIES,  2/13/2023 8:33 AM     COMPARISON: Ultrasound 2/6/2020    HISTORY: bilateral leg pain - rule out     TECHNIQUE:  Gray-scale evaluation with compression, spectral flow and  color Doppler assessment of the deep venous system of both legs from  groin to knee, and then at the ankles.    FINDINGS:  In both lower extremities, the common femoral, femoral, popliteal  and  posterior tibial veins demonstrate normal compressibility and blood  flow.      Impression    IMPRESSION:  No evidence of deep venous thrombosis in either lower extremity.    I have personally reviewed the examination and initial interpretation  and I agree with the findings.    ASHLEE ESTRADA MD         SYSTEM ID:  [id number]   Radiologist Consult For Cardiology     Status: None    Narrative    EXAMINATION: RADIOLOGIST CONSULT FOR CARDIOLOGY, 2/13/2023 10:23 AM     COMPARISON: None.    HISTORY: None    TECHNIQUE: Limited field of view CT images. Please refer to separate  dictation for the cardiology portion of the study.    FINDINGS:     Heart size is within normal limits. Normal caliber of the visualized  thoracic aorta and main pulmonary artery. No significant coronary  artery atherosclerotic calcifications. No pericardial effusion. The  visualized mediastinal and hilar lymph nodes are not enlarged.    Visualized central tracheobronchial tree is patent. No pleural  effusion or pneumothorax at this level. The visualized lungs are  clear. Mild bibasilar atelectasis.    Visualized upper abdomen is unremarkable. No acute osseous lesions of  the visualized thorax.      Impression    IMPRESSION:  1. No acute abnormality on the CT images of the lower chest and upper  abdomen.  2. Please refer to separate cardiology report for further information.    I have personally reviewed the examination and initial interpretation  and I agree with the findings.    ALMA KENDRICK MD         SYSTEM ID:  [id number]   [place] [facility]     Status: None    Narrative    The following orders were created for panel order [place] [facility].  Procedure                               Abnormality         Status                     ---------                               -----------         ------                     Extra Blue Top Tube[[id number]]                              Final result               Extra Green Top (Lithium...[[id number]]                                                  Extra Purple Top Tube[[id number]]                            Final result                 Please view results for these tests on the individual orders.   Extra Blue Top Tube     Status: None   Result Value Ref Range    Hold Specimen JIC    Extra Purple Top Tube     Status: None   Result Value Ref Range    Hold Specimen JIC    CRP inflammation     Status: Abnormal   Result Value Ref Range    CRP Inflammation 9.10 (H) <5.00 mg/L   Partial thromboplastin time     Status: Normal   Result Value Ref Range    aPTT 28 22 - 38 Seconds   INR     Status: Normal   Result Value Ref Range    INR 1.01 0.85 - 1.15   Comprehensive metabolic panel     Status: Abnormal   Result Value Ref Range    Sodium 130 (L) 136 - 145 mmol/L    Potassium 4.1 3.4 - 5.3 mmol/L    Chloride 98 98 - 107 mmol/L    Carbon Dioxide (CO2) 24 22 - 29 mmol/L    Anion Gap 8 7 - 15 mmol/L    Urea Nitrogen 18.4 6.0 - 20.0 mg/dL    Creatinine 0.81 0.51 - 0.95 mg/dL    Calcium 9.0 8.6 - 10.0 mg/dL    Glucose 88 70 - 99 mg/dL    Alkaline Phosphatase 57 35 - 104 U/L    AST 20 10 - 35 U/L    ALT 16 10 - 35 U/L    Protein Total 7.7 6.4 - 8.3 g/dL    Albumin 4.6 3.5 - 5.2 g/dL    Bilirubin Total 0.7 <=1.2 mg/dL    GFR Estimate >90 >60 mL/min/1.73m2   Troponin T, High Sensitivity     Status: Normal   Result Value Ref Range    Troponin T, High Sensitivity <6 <=14 ng/L   TSH     Status: Normal   Result Value Ref Range    TSH 1.30 0.30 - 4.20 uIU/mL   Nt probnp inpatient (BNP)     Status: Normal   Result Value Ref Range    N terminal Pro BNP Inpatient <36 0 - 450 pg/mL   HCG qualitative Blood     Status: Normal   Result Value Ref Range    hCG Serum Qualitative Negative Negative   UA with Microscopic reflex to Culture     Status: Abnormal    Specimen: Urine, Midstream   Result Value Ref Range    Color Urine Light Yellow Colorless, Straw, Light Yellow, Yellow    Appearance Urine Clear Clear    Glucose Urine Negative Negative mg/dL     Bilirubin Urine Negative Negative    Ketones Urine Negative Negative mg/dL    Specific Gravity Urine 1.027 1.003 - 1.035    Blood Urine Negative Negative    pH Urine 5.5 5.0 - 7.0    Protein Albumin Urine 10 (A) Negative mg/dL    Urobilinogen Urine Normal Normal, 2.0 mg/dL    Nitrite Urine Negative Negative    Leukocyte Esterase Urine Negative Negative    Bacteria Urine Few (A) None Seen /HPF    Mucus Urine Present (A) None Seen /LPF    RBC Urine <1 <=2 /HPF    WBC Urine 4 <=5 /HPF    Squamous Epithelials Urine 3 (H) <=1 /HPF    Narrative    Urine Culture not indicated   CBC with platelets and differential     Status: None   Result Value Ref Range    WBC Count 6.5 4.0 - 11.0 10e3/uL    RBC Count 4.25 3.80 - 5.20 10e6/uL    Hemoglobin 13.4 11.7 - 15.7 g/dL    Hematocrit 40.2 35.0 - 47.0 %    MCV 95 78 - 100 fL    MCH 31.5 26.5 - 33.0 pg    MCHC 33.3 31.5 - 36.5 g/dL    RDW 12.4 10.0 - 15.0 %    Platelet Count 235 150 - 450 10e3/uL    % Neutrophils 50 %    % Lymphocytes 32 %    % Monocytes 10 %    % Eosinophils 7 %    % Basophils 1 %    % Immature Granulocytes 0 %    NRBCs per 100 WBC 0 <1 /100    Absolute Neutrophils 3.3 1.6 - 8.3 10e3/uL    Absolute Lymphocytes 2.0 0.8 - 5.3 10e3/uL    Absolute Monocytes 0.7 0.0 - 1.3 10e3/uL    Absolute Eosinophils 0.4 0.0 - 0.7 10e3/uL    Absolute Basophils 0.1 0.0 - 0.2 10e3/uL    Absolute Immature Granulocytes 0.0 <=0.4 10e3/uL    Absolute NRBCs 0.0 10e3/uL   Asymptomatic COVID-19 Virus (Coronavirus) by PCR Nasopharyngeal     Status: Normal    Specimen: Nasopharyngeal; Swab   Result Value Ref Range    SARS CoV2 PCR Negative Negative    Narrative    Testing was performed using the Xpert Xpress SARS-CoV-2 Assay on the Cepheid Gene-Xpert Instrument Systems. Additional information about this Emergency Use Authorization (EUA) assay can be found via the Lab Guide. This test should be ordered for the detection of SARS-CoV-2 in individuals who meet SARS-CoV-2 clinical and/or  epidemiological criteria as well as from individuals without symptoms or other reasons to suspect COVID-19. Test performance for asymptomatic patients has only been established in anterior nasal swab specimens. This test is for in vitro diagnostic use under the FDA EUA for laboratories certified under CLIA to perform high complexity testing. This test has not been FDA cleared or approved. A negative result does not rule out the presence of PCR inhibitors in the specimen or target RNA concentration below the limit of detection for the assay. The possibility of a false negative should be considered if the patient's recent exposure or clinical presentation suggests COVID-19. This test was validated by the [facility]. This laboratory is certified under the Clinical Laboratory Improvement Amendments (CLIA) as qualified to perform high complexity laboratory testing.     Troponin T, High Sensitivity     Status: Normal   Result Value Ref Range    Troponin T, High Sensitivity <6 <=14 ng/L   Creatinine     Status: Normal   Result Value Ref Range    Creatinine 0.89 0.51 - 0.95 mg/dL    GFR Estimate 82 >60 mL/min/1.73m2   Basic metabolic panel     Status: Abnormal   Result Value Ref Range    Sodium 138 136 - 145 mmol/L    Potassium 4.2 3.4 - 5.3 mmol/L    Chloride 107 98 - 107 mmol/L    Carbon Dioxide (CO2) 21 (L) 22 - 29 mmol/L    Anion Gap 10 7 - 15 mmol/L    Urea Nitrogen 15.1 6.0 - 20.0 mg/dL    Creatinine 0.87 0.51 - 0.95 mg/dL    Calcium 8.4 (L) 8.6 - 10.0 mg/dL    Glucose 87 70 - 99 mg/dL    GFR Estimate 84 >60 mL/min/1.73m2   CBC with platelets and differential     Status: None   Result Value Ref Range    WBC Count 4.2 4.0 - 11.0 10e3/uL    RBC Count 4.12 3.80 - 5.20 10e6/uL    Hemoglobin 12.9 11.7 - 15.7 g/dL    Hematocrit 40.0 35.0 - 47.0 %    MCV 97 78 - 100 fL    MCH 31.3 26.5 - 33.0 pg    MCHC 32.3 31.5 - 36.5 g/dL    RDW 12.6 10.0 - 15.0 %    Platelet Count 235 150 - 450  10e3/uL    % Neutrophils 39 %    % Lymphocytes 44 %    % Monocytes 9 %    % Eosinophils 7 %    % Basophils 1 %    % Immature Granulocytes 0 %    NRBCs per 100 WBC 0 <1 /100    Absolute Neutrophils 1.6 1.6 - 8.3 10e3/uL    Absolute Lymphocytes 1.9 0.8 - 5.3 10e3/uL    Absolute Monocytes 0.4 0.0 - 1.3 10e3/uL    Absolute Eosinophils 0.3 0.0 - 0.7 10e3/uL    Absolute Basophils 0.0 0.0 - 0.2 10e3/uL    Absolute Immature Granulocytes 0.0 <=0.4 10e3/uL    Absolute NRBCs 0.0 10e3/uL   CBC with platelets     Status: Normal   Result Value Ref Range    WBC Count 5.3 4.0 - 11.0 10e3/uL    RBC Count 4.13 3.80 - 5.20 10e6/uL    Hemoglobin 12.7 11.7 - 15.7 g/dL    Hematocrit 40.3 35.0 - 47.0 %    MCV 98 78 - 100 fL    MCH 30.8 26.5 - 33.0 pg    MCHC 31.5 31.5 - 36.5 g/dL    RDW 12.3 10.0 - 15.0 %    Platelet Count 246 150 - 450 10e3/uL   Basic metabolic panel     Status: Abnormal   Result Value Ref Range    Sodium 136 136 - 145 mmol/L    Potassium 4.0 3.4 - 5.3 mmol/L    Chloride 106 98 - 107 mmol/L    Carbon Dioxide (CO2) 21 (L) 22 - 29 mmol/L    Anion Gap 9 7 - 15 mmol/L    Urea Nitrogen 16.9 6.0 - 20.0 mg/dL    Creatinine 0.86 0.51 - 0.95 mg/dL    Calcium 8.6 8.6 - 10.0 mg/dL    Glucose 96 70 - 99 mg/dL    GFR Estimate 85 >60 mL/min/1.73m2   EKG 12 lead     Status: None   Result Value Ref Range    Systolic Blood Pressure  mmHg    Diastolic Blood Pressure  mmHg    Ventricular Rate 60 BPM    Atrial Rate 60 BPM    OR Interval 156 ms    QRS Duration 88 ms     ms    QTc 430 ms    P Axis 50 degrees    R AXIS -19 degrees    T Axis 22 degrees    Interpretation ECG       Sinus rhythm with sinus arrhythmia  Normal ECG  Unconfirmed report - interpretation of this ECG is computer generated - see medical record for final interpretation    Confirmed by - EMERGENCY ROOM, PHYSICIAN ([id number]),  OMERO HERNANDEZ ([id number]) on 2/12/2023 8:13:42 AM     CTA  ANGIOGRAM CORONARY ARTERY     Status: None    Narrative    Procedure: CTA ANGIOGRAM  CORONARY ARTERY   Examination Date: 2023 10:23 AM   Indication: Chest pain - hx of PFO/PDA   Ordering Provider: ER Obs  Overall quality of the study: Good.     PROCEDURE: ECG gated multi-slice computed tomography of the heart with  and without intravenous contrast  (Isovue 370, 120 mL at rate of 5.5  mL/sec) was performed on a Siemens Dual Source Flash scanner without  incident. Medical therapy was used to optimize heart rate (metoprolol  0 mg oral, ivabradine 0 oral,  metoprolol 10 mg IV). Sublingual  Nitrostat 0.8 mg was given prior to scanning. Coronary artery calcium  scoring was performed using the Flash scanner protocol. The CTA  portion was performed in the spiral mode at a heart rate of 53 bpm  with 100 kVp. Images were reconstructed and analyzed on a [employer]  workstation. The scan protocol was optimized to minimize radiation  exposure. The total radiation exposure, including calcium artery  calcium scoring, was calculated to be 591 DLP and 8.3 mSv.        Impression    IMPRESSION:  1.  Widely patent coronary arteries without evidence of  atherosclerosis or stenosis.  2.  Total Agatston score 0 placing the patient in the lowest  percentile when compared to an age- and gender-matched control group.  3.  The known patent foramen ovale is again visualized.   4.  Please review the separate Radiology report for incidental  noncardiac findings.    FINDINGS:    CORONARY CALCIUM SCORE    Total Agatston calcium score: 0   Left main: 0  Left anterior descendin  Left circumflex: 0  Right coronary artery: 0   This places the patient in the lowest  percentile when compared to an  age- and gender-matched control group.    CORONARY ANGIOGRAPHY    DOMINANCE: Right dominant system.   Normal coronary origins and course.    LEFT MAIN:   The LM is a large caliber vessel.   The left main arises normally from the left coronary cusp and is  widely patent without detectable atherosclerosis or stenosis.     LEFT ANTERIOR  DESCENDING:   The LAD is a moderate caliber vessel. It has a type III morphology. It  gives rise to a moderate caliber first diagonal, a large caliber  second diagonal, and a moderate caliber third diagonal branch.   The left anterior descending and its major diagonal branches are  widely patent without detectable atherosclerosis or stenosis.    LEFT CIRCUMFLEX:   The LCx is a moderate caliber vessel. It gives rise to a large caliber  first obtuse marginal branch.   The left circumflex and its major branches are widely patent without  detectable atherosclerosis or stenosis.    RIGHT CORONARY ARTERY:   The RCA is a moderate caliber, dominant vessel.    The right coronary artery and its major branches are widely patent  without detectable atherosclerosis or stenosis.    ADDITIONAL FINDINGS:   The known patent foramen ovale is again visualized.   The proximal ascending aorta is normal in size.   Normal pulmonary venous anatomy. All four pulmonary veins drain into  the left atrium.    There is no left ventricular mass or thrombus.   Normal pericardial thickness. There is no pericardial effusion.  Please review the separate Radiology report for incidental noncardiac  findings.    AALIYAH DORAN MD         SYSTEM ID:  [id number]   CBC with platelets differential     Status: None    Narrative    The following orders were created for panel order CBC with platelets differential.  Procedure                               Abnormality         Status                     ---------                               -----------         ------                     CBC with platelets and d...[[id number]]                      Final result                 Please view results for these tests on the individual orders.   CBC with Platelets & Differential     Status: None    Narrative    The following orders were created for panel order CBC with Platelets & Differential.  Procedure                               Abnormality         Status                      ---------                               -----------         ------                     CBC with platelets and d...[[id number]]                      Final result                 Please view results for these tests on the individual orders.      Recent Results (from the past 48 hour(s))   MR Brain w/o & w Contrast    Narrative    EXAM: MR BRAIN W/O and W CONTRAST, MRA NECK (CAROTIDS) W/O and W CONTRAST, MRA BRAIN ([place]) W/O CONTRAST  LOCATION: [facility]  DATE/TIME: 2/12/2023 2:01 PM    INDICATION: Headache; Acute HA (< 3 months), no complicating features  COMPARISON: CT 02/12/2023  CONTRAST: 9.5ml gadovist  TECHNIQUE:   1) Routine multiplanar multisequence head MRI without and with intravenous contrast.  2) 3D time-of-flight head MRA without intravenous contrast.  3) Neck MRA without and with IV contrast. Stenosis measurements made according to NASCET criteria unless otherwise specified.    FINDINGS:  HEAD MRI:  INTRACRANIAL CONTENTS: No acute or subacute infarct. No mass, acute hemorrhage, or extra-axial fluid collections. Scattered nonspecific foci of T2/FLAIR hyperintense signal in the cerebral white matter. There is no involvement of the brachium pontis,   anterior temporal white matter, or corpus callosum. Normal ventricles and sulci. Normal position of the cerebellar tonsils. No pathologic contrast enhancement. No corresponding MR signal abnormality or enhancement to the area of hyperattenuation in the   fourth ventricle on comparison head CT which likely represents calcified choroid plexus.    SELLA: No abnormality accounting for technique.    OSSEOUS STRUCTURES/SOFT TISSUES: Normal marrow signal. The major intracranial vascular flow voids are maintained.     ORBITS: No abnormality accounting for technique.     SINUSES/MASTOIDS: Moderate mucosal thickening scattered about the paranasal sinuses. A left frontal air fluid levels noted. No middle ear  or mastoid effusion.     HEAD MRA:   ANTERIOR CIRCULATION: No stenosis/occlusion, aneurysm, or high flow vascular malformation. Fetal origin of the left posterior cerebral artery from the anterior circulation.    POSTERIOR CIRCULATION: No stenosis/occlusion, aneurysm, or high flow vascular malformation. Balanced vertebral arteries supply a normal basilar artery.     NECK MRA:   RIGHT CAROTID: No measurable stenosis or dissection.    LEFT CAROTID: No measurable stenosis or dissection.    VERTEBRAL ARTERIES: No focal stenosis or dissection. Balanced vertebral arteries.    AORTIC ARCH: Aortic arch is incompletely imaged. Suspect bovine anatomy of the arch. There is no significant stenosis in the visualized great vessels.      Impression    IMPRESSION:  HEAD MRI:   1.  No acute intracranial pathology or abnormal intracranial enhancement.  2.  Nonspecific scattered white matter T2/FLAIR signal abnormality is noted which mildly prominent for patients age. Findings are nonspecific and could be seen with chronic migraine, sequela of remote insult, chronic small vessel ischemic disease, or   demyelinating process.   3.  Moderate paranasal sinus disease and left frontal air fluid level, nonspecific, can be seen with acute sinusitis in the appropriate clinical setting.    HEAD MRA:   1.  No aneurysm, high flow AVM or significant stenosis identified.  2.  Variant [place] anatomy as above.    NECK MRA:  1.  Normal neck MRA.   MRA Neck (Carotids) wo & w Contrast    Narrative    EXAM: MR BRAIN W/O and W CONTRAST, MRA NECK (CAROTIDS) W/O and W CONTRAST, MRA BRAIN ([place]) W/O CONTRAST  LOCATION: [facility]  DATE/TIME: 2/12/2023 2:01 PM    INDICATION: Headache; Acute HA (< 3 months), no complicating features  COMPARISON: CT 02/12/2023  CONTRAST: 9.5ml gadovist  TECHNIQUE:   1) Routine multiplanar multisequence head MRI without and with intravenous contrast.  2) 3D  time-of-flight head MRA without intravenous contrast.  3) Neck MRA without and with IV contrast. Stenosis measurements made according to NASCET criteria unless otherwise specified.    FINDINGS:  HEAD MRI:  INTRACRANIAL CONTENTS: No acute or subacute infarct. No mass, acute hemorrhage, or extra-axial fluid collections. Scattered nonspecific foci of T2/FLAIR hyperintense signal in the cerebral white matter. There is no involvement of the brachium pontis,   anterior temporal white matter, or corpus callosum. Normal ventricles and sulci. Normal position of the cerebellar tonsils. No pathologic contrast enhancement. No corresponding MR signal abnormality or enhancement to the area of hyperattenuation in the   fourth ventricle on comparison head CT which likely represents calcified choroid plexus.    SELLA: No abnormality accounting for technique.    OSSEOUS STRUCTURES/SOFT TISSUES: Normal marrow signal. The major intracranial vascular flow voids are maintained.     ORBITS: No abnormality accounting for technique.     SINUSES/MASTOIDS: Moderate mucosal thickening scattered about the paranasal sinuses. A left frontal air fluid levels noted. No middle ear or mastoid effusion.     HEAD MRA:   ANTERIOR CIRCULATION: No stenosis/occlusion, aneurysm, or high flow vascular malformation. Fetal origin of the left posterior cerebral artery from the anterior circulation.    POSTERIOR CIRCULATION: No stenosis/occlusion, aneurysm, or high flow vascular malformation. Balanced vertebral arteries supply a normal basilar artery.     NECK MRA:   RIGHT CAROTID: No measurable stenosis or dissection.    LEFT CAROTID: No measurable stenosis or dissection.    VERTEBRAL ARTERIES: No focal stenosis or dissection. Balanced vertebral arteries.    AORTIC ARCH: Aortic arch is incompletely imaged. Suspect bovine anatomy of the arch. There is no significant stenosis in the visualized great vessels.      Impression    IMPRESSION:  HEAD MRI:   1.  No  acute intracranial pathology or abnormal intracranial enhancement.  2.  Nonspecific scattered white matter T2/FLAIR signal abnormality is noted which mildly prominent for patients age. Findings are nonspecific and could be seen with chronic migraine, sequela of remote insult, chronic small vessel ischemic disease, or   demyelinating process.   3.  Moderate paranasal sinus disease and left frontal air fluid level, nonspecific, can be seen with acute sinusitis in the appropriate clinical setting.    HEAD MRA:   1.  No aneurysm, high flow AVM or significant stenosis identified.  2.  Variant [place] anatomy as above.    NECK MRA:  1.  Normal neck MRA.   MRA Brain ([place]) wo Contrast    Narrative    EXAM: MR BRAIN W/O and W CONTRAST, MRA NECK (CAROTIDS) W/O and W CONTRAST, MRA BRAIN ([place]) W/O CONTRAST  LOCATION: [facility]  DATE/TIME: 2/12/2023 2:01 PM    INDICATION: Headache; Acute HA (< 3 months), no complicating features  COMPARISON: CT 02/12/2023  CONTRAST: 9.5ml gadovist  TECHNIQUE:   1) Routine multiplanar multisequence head MRI without and with intravenous contrast.  2) 3D time-of-flight head MRA without intravenous contrast.  3) Neck MRA without and with IV contrast. Stenosis measurements made according to NASCET criteria unless otherwise specified.    FINDINGS:  HEAD MRI:  INTRACRANIAL CONTENTS: No acute or subacute infarct. No mass, acute hemorrhage, or extra-axial fluid collections. Scattered nonspecific foci of T2/FLAIR hyperintense signal in the cerebral white matter. There is no involvement of the brachium pontis,   anterior temporal white matter, or corpus callosum. Normal ventricles and sulci. Normal position of the cerebellar tonsils. No pathologic contrast enhancement. No corresponding MR signal abnormality or enhancement to the area of hyperattenuation in the   fourth ventricle on comparison head CT which likely represents  calcified choroid plexus.    SELLA: No abnormality accounting for technique.    OSSEOUS STRUCTURES/SOFT TISSUES: Normal marrow signal. The major intracranial vascular flow voids are maintained.     ORBITS: No abnormality accounting for technique.     SINUSES/MASTOIDS: Moderate mucosal thickening scattered about the paranasal sinuses. A left frontal air fluid levels noted. No middle ear or mastoid effusion.     HEAD MRA:   ANTERIOR CIRCULATION: No stenosis/occlusion, aneurysm, or high flow vascular malformation. Fetal origin of the left posterior cerebral artery from the anterior circulation.    POSTERIOR CIRCULATION: No stenosis/occlusion, aneurysm, or high flow vascular malformation. Balanced vertebral arteries supply a normal basilar artery.     NECK MRA:   RIGHT CAROTID: No measurable stenosis or dissection.    LEFT CAROTID: No measurable stenosis or dissection.    VERTEBRAL ARTERIES: No focal stenosis or dissection. Balanced vertebral arteries.    AORTIC ARCH: Aortic arch is incompletely imaged. Suspect bovine anatomy of the arch. There is no significant stenosis in the visualized great vessels.      Impression    IMPRESSION:  HEAD MRI:   1.  No acute intracranial pathology or abnormal intracranial enhancement.  2.  Nonspecific scattered white matter T2/FLAIR signal abnormality is noted which mildly prominent for patients age. Findings are nonspecific and could be seen with chronic migraine, sequela of remote insult, chronic small vessel ischemic disease, or   demyelinating process.   3.  Moderate paranasal sinus disease and left frontal air fluid level, nonspecific, can be seen with acute sinusitis in the appropriate clinical setting.    HEAD MRA:   1.  No aneurysm, high flow AVM or significant stenosis identified.  2.  Variant [place] anatomy as above.    NECK MRA:  1.  Normal neck MRA.   XR Chest 2 Views    Narrative    EXAM: XR CHEST 2 VIEWS  LOCATION: [facility]  MEDICAL [facility]  DATE/TIME: 2/12/2023 3:40 PM    INDICATION: Chest pain and palpitations  COMPARISON: None.      Impression    IMPRESSION: Negative chest.   US Lower Extremity Venous Duplex Bilateral    Narrative    EXAMINATION: DOPPLER VENOUS ULTRASOUND OF BILATERAL LOWER EXTREMITIES,  2/13/2023 8:33 AM     COMPARISON: Ultrasound 2/6/2020    HISTORY: bilateral leg pain - rule out     TECHNIQUE:  Gray-scale evaluation with compression, spectral flow and  color Doppler assessment of the deep venous system of both legs from  groin to knee, and then at the ankles.    FINDINGS:  In both lower extremities, the common femoral, femoral, popliteal and  posterior tibial veins demonstrate normal compressibility and blood  flow.      Impression    IMPRESSION:  No evidence of deep venous thrombosis in either lower extremity.    I have personally reviewed the examination and initial interpretation  and I agree with the findings.    ASHLEE ESTRADA MD         SYSTEM ID:  [id number]   CTA  ANGIOGRAM CORONARY ARTERY    Narrative    Procedure: CTA ANGIOGRAM CORONARY ARTERY   Examination Date: 2/13/2023 10:23 AM   Indication: Chest pain - hx of PFO/PDA   Ordering Provider: ER Obs  Overall quality of the study: Good.     PROCEDURE: ECG gated multi-slice computed tomography of the heart with  and without intravenous contrast  (Isovue 370, 120 mL at rate of 5.5  mL/sec) was performed on a Siemens Dual Source Flash scanner without  incident. Medical therapy was used to optimize heart rate (metoprolol  0 mg oral, ivabradine 0 oral,  metoprolol 10 mg IV). Sublingual  Nitrostat 0.8 mg was given prior to scanning. Coronary artery calcium  scoring was performed using the Flash scanner protocol. The CTA  portion was performed in the spiral mode at a heart rate of 53 bpm  with 100 kVp. Images were reconstructed and analyzed on a [employer]  workstation. The scan protocol was optimized to minimize radiation  exposure. The total radiation exposure,  including calcium artery  calcium scoring, was calculated to be 591 DLP and 8.3 mSv.        Impression    IMPRESSION:  1.  Widely patent coronary arteries without evidence of  atherosclerosis or stenosis.  2.  Total Agatston score 0 placing the patient in the lowest  percentile when compared to an age- and gender-matched control group.  3.  The known patent foramen ovale is again visualized.   4.  Please review the separate Radiology report for incidental  noncardiac findings.    FINDINGS:    CORONARY CALCIUM SCORE    Total Agatston calcium score: 0   Left main: 0  Left anterior descendin  Left circumflex: 0  Right coronary artery: 0   This places the patient in the lowest  percentile when compared to an  age- and gender-matched control group.    CORONARY ANGIOGRAPHY    DOMINANCE: Right dominant system.   Normal coronary origins and course.    LEFT MAIN:   The LM is a large caliber vessel.   The left main arises normally from the left coronary cusp and is  widely patent without detectable atherosclerosis or stenosis.     LEFT ANTERIOR DESCENDING:   The LAD is a moderate caliber vessel. It has a type III morphology. It  gives rise to a moderate caliber first diagonal, a large caliber  second diagonal, and a moderate caliber third diagonal branch.   The left anterior descending and its major diagonal branches are  widely patent without detectable atherosclerosis or stenosis.    LEFT CIRCUMFLEX:   The LCx is a moderate caliber vessel. It gives rise to a large caliber  first obtuse marginal branch.   The left circumflex and its major branches are widely patent without  detectable atherosclerosis or stenosis.    RIGHT CORONARY ARTERY:   The RCA is a moderate caliber, dominant vessel.    The right coronary artery and its major branches are widely patent  without detectable atherosclerosis or stenosis.    ADDITIONAL FINDINGS:   The known patent foramen ovale is again visualized.   The proximal ascending aorta is normal  in size.   Normal pulmonary venous anatomy. All four pulmonary veins drain into  the left atrium.    There is no left ventricular mass or thrombus.   Normal pericardial thickness. There is no pericardial effusion.  Please review the separate Radiology report for incidental noncardiac  findings.    AALIYAH DORAN MD         SYSTEM ID:  [id number]   Radiologist Consult For Cardiology    Narrative    EXAMINATION: RADIOLOGIST CONSULT FOR CARDIOLOGY, 2/13/2023 10:23 AM     COMPARISON: None.    HISTORY: None    TECHNIQUE: Limited field of view CT images. Please refer to separate  dictation for the cardiology portion of the study.    FINDINGS:     Heart size is within normal limits. Normal caliber of the visualized  thoracic aorta and main pulmonary artery. No significant coronary  artery atherosclerotic calcifications. No pericardial effusion. The  visualized mediastinal and hilar lymph nodes are not enlarged.    Visualized central tracheobronchial tree is patent. No pleural  effusion or pneumothorax at this level. The visualized lungs are  clear. Mild bibasilar atelectasis.    Visualized upper abdomen is unremarkable. No acute osseous lesions of  the visualized thorax.      Impression    IMPRESSION:  1. No acute abnormality on the CT images of the lower chest and upper  abdomen.  2. Please refer to separate cardiology report for further information.    I have personally reviewed the examination and initial interpretation  and I agree with the findings.    ALMA KENDRICK MD         SYSTEM ID:  [id number]                Pending Results:   Unresulted Labs Ordered in the Past 30 Days of this Admission     No orders found from 1/13/2023 to 2/13/2023.                  Discharge Instructions and Follow-Up:     Discharge Procedure Orders   Physical Therapy Referral   Standing Status: Future   Referral Priority: Routine: Next available opening Referral Type: Rehab Therapy Physical Therapy   Number of Visits Requested: 1     Reason  for your hospital stay   Order Comments: Chest pain and dizziness     Activity   Order Comments: Your activity upon discharge: activity as tolerated     Order Specific Question Answer Comments   Is discharge order? Yes      When to contact your care team   Order Comments: Please go to your nearest emergency room If you were to have a change in the type of chest pain i.e more severe, lasting longer or radiating to your shoulder, arm, neck, jaw or back, shortness of breath or increased pain with breathing, coughing up blood, feel dizzy or lightheaded, or notice swelling in one leg.     Discharge Instructions   Order Comments: You were admitted to the observation unit for evaluation of your chest pain.  Your EKG was normal. Troponins (a lab test to check if there was damage to the heart tissue) were checked and these were negative. Chest x-ray was normal.  You underwent a stress test and this was normal.  Your case was also reviewed by cardiology. They do not recommend any further cardiology work up at this time. For the vertigo symptoms, I will refer you for vestibular therapy for further treatment. I recommend continuing your home medications and it will be very important to follow up with your primary care doctor early next week or sooner if your symptoms return.     Full Code     Order Specific Question Answer Comments   Code status determined by: Discussion with patient/ legal decision maker      Diet   Order Comments: Follow this diet upon discharge: Regular     Order Specific Question Answer Comments   Is discharge order? Yes           Attestation:  Marilia Felder PA-C.

## 2023-02-14 NOTE — PLAN OF CARE
"Observation Goals:   - Serial troponins and stress test complete - Met   - Seen and cleared by consultant if applicable - Not met. Cleared by Cardiology. Pt evaluation for BPPV tomorrow.   - Adequate pain control on oral analgesia - Met   - Vital signs normal or at patient baseline - Met  /73 (BP Location: Right arm)   Pulse 77   Temp 97.6  F (36.4  C) (Oral)   Resp 15   Ht 1.575 m (5' 2\")   Wt 94.8 kg (209 lb)   SpO2 99%   BMI 38.23 kg/m    - Safe disposition plan has been identified - Not met  "

## 2023-02-14 NOTE — PLAN OF CARE
"Observation Goals:   - Serial troponins and stress test complete - Met   - Seen and cleared by consultant if applicable - Not met. Cleared by Cardiology. Pt evaluation for BPPV tomorrow.   - Adequate pain control on oral analgesia - Met   - Vital signs normal or at patient baseline - Met  /83 (BP Location: Right arm)   Pulse 75   Temp 98.1  F (36.7  C) (Oral)   Resp 15   Ht 1.575 m (5' 2\")   Wt 94.8 kg (209 lb)   SpO2 99%   BMI 38.23 kg/m    - Safe disposition plan has been identified - Not met  "

## 2023-02-14 NOTE — PLAN OF CARE
Goal Outcome Evaluation:  - Serial troponins and stress test complete: Met   - Seen and cleared by consultant if applicable: Met   - Adequate pain control on oral analgesia: Met   - Vital signs normal or at patient baseline: Met   - Safe disposition plan has been identified: Met

## 2023-02-14 NOTE — PLAN OF CARE
[facility]  OUTPATIENT PHYSICAL THERAPY EVALUATION  PLAN OF TREATMENT FOR OUTPATIENT REHABILITATION  (COMPLETE FOR INITIAL CLAIMS ONLY)  Patient's Last Name, First Name, M.I.  YOB: 1980  MarichuyhusseinRadha                           Provider's Name  [facility] Medical Record No.  [id number]                             Onset Date:  02/12/23   Start of Care Date:  02/14/23   Type:     _X_PT   ___OT   ___SLP Medical Diagnosis:  BPPV              PT Diagnosis:  BPPV Visits from SOC:  1     See note for plan of treatment, functional goals and certification details    I CERTIFY THE NEED FOR THESE SERVICES FURNISHED UNDER        THIS PLAN OF TREATMENT AND WHILE UNDER MY CARE     (Physician co-signature of this document indicates review and certification of the therapy plan).

## 2023-02-14 NOTE — PROGRESS NOTES
"Emergency Medicine Observation Attending note    The patient was independently seen and examined by me. The chart, vital signs, and labs were reviewed. The patient's findings were discussed with the NAY on the observation unit, and I agree with the findings of the note and the plan.    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 No fever. She also reported having some chest pressure and palpitations for a few days. No GI symptoms, no cough. No leg pain or swelling. She did have an MRI in the ED, which revealed non specific white matter T2/FLAIR signal abnormality which is mildly prominent for patient's age. This was discussed with neurology who felt it was possibly secondary to history of migraines and that no further testing was warranted.MRA of the brain and neck were unremarkable. No acute ischemic change was noted on EKG in the ED, and basic labs were not remarkable. She was admitted for ACS rule out and sx control. This morning she reports that the dizziness is still there, but moving in the right direction. Chest discomfort is better.     /83 (BP Location: Right arm)   Pulse 64   Temp 97.8  F (36.6  C) (Oral)   Resp 16   Ht 1.575 m (5' 2\")   Wt 94.8 kg (209 lb)   SpO2 100%   BMI 38.23 kg/m        Exam:  General: awake, alert, NAD  HEENT: NC/AT  Neck: supple  Lungs: CTA-B  Heart: RRR, no M/R/G  Abd: soft, ND/NT  Ext: Non-tender  Neuro: CN 2-12 intact. Strength and sensation nl and equal bilaterally      Assessment/plan:  1. Vertigo - moving in the right direction. Sx medication management and PT consult.   2. Chest pain - improved. EKG " non-ischemic. CXR neg for acute abnormality. CT angiogram without any coronary concerns, and no PE or other acute abnormality.  3. Dispo - likely discharge today.

## 2023-02-16 ENCOUNTER — HOSPITAL ENCOUNTER (OUTPATIENT)
Dept: PHYSICAL THERAPY | Facility: CLINIC | Age: 43
Discharge: HOME | End: 2023-02-16
Attending: PHYSICIAN ASSISTANT
Payer: COMMERCIAL

## 2023-02-16 DIAGNOSIS — H81.13 BENIGN PAROXYSMAL POSITIONAL VERTIGO, BILATERAL: ICD-10-CM

## 2023-02-16 PROCEDURE — 97162 PT EVAL MOD COMPLEX 30 MIN: CPT | Mod: GP | Performed by: PHYSICAL THERAPIST

## 2023-02-16 PROCEDURE — 97750 PHYSICAL PERFORMANCE TEST: CPT | Mod: GP | Performed by: PHYSICAL THERAPIST
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
## 2023-03-01 ENCOUNTER — HOSPITAL ENCOUNTER (OUTPATIENT)
Dept: PHYSICAL THERAPY | Facility: CLINIC | Age: 43
Discharge: HOME | End: 2023-03-01
Payer: COMMERCIAL

## 2023-03-01 DIAGNOSIS — H81.13 BENIGN PAROXYSMAL POSITIONAL VERTIGO, BILATERAL: Primary | ICD-10-CM

## 2023-03-01 PROCEDURE — 97112 NEUROMUSCULAR REEDUCATION: CPT | Mod: GP

## 2023-03-01 PROCEDURE — 97750 PHYSICAL PERFORMANCE TEST: CPT | Mod: GP

## 2023-08-28 ENCOUNTER — ANCILLARY ORDERS (OUTPATIENT)
Dept: MAMMOGRAPHY | Facility: CLINIC | Age: 43
End: 2023-08-28

## 2023-08-28 ENCOUNTER — MEDICAL CORRESPONDENCE (OUTPATIENT)
Dept: OBGYN | Facility: CLINIC | Age: 43
End: 2023-08-28
Payer: COMMERCIAL

## 2023-08-28 ENCOUNTER — ANCILLARY ORDERS (OUTPATIENT)
Dept: GENERAL RADIOLOGY | Facility: CLINIC | Age: 43
End: 2023-08-28

## 2023-08-28 DIAGNOSIS — Z12.31 ENCOUNTER FOR SCREENING MAMMOGRAM FOR BREAST CANCER: Primary | ICD-10-CM

## 2023-08-28 DIAGNOSIS — K59.00 CONSTIPATION, UNSPECIFIED: Primary | ICD-10-CM

## 2023-09-17 ENCOUNTER — HEALTH MAINTENANCE LETTER (OUTPATIENT)
Age: 43
End: 2023-09-17

## 2023-09-19 ENCOUNTER — ANCILLARY PROCEDURE (OUTPATIENT)
Dept: MAMMOGRAPHY | Facility: CLINIC | Age: 43
End: 2023-09-19
Attending: INTERNAL MEDICINE
Payer: COMMERCIAL

## 2023-09-19 DIAGNOSIS — Z12.31 ENCOUNTER FOR SCREENING MAMMOGRAM FOR BREAST CANCER: ICD-10-CM

## 2023-09-19 PROCEDURE — 77067 SCR MAMMO BI INCL CAD: CPT | Mod: TC | Performed by: RADIOLOGY

## 2023-09-19 PROCEDURE — 77063 BREAST TOMOSYNTHESIS BI: CPT | Mod: TC | Performed by: RADIOLOGY

## 2023-09-21 ENCOUNTER — HOSPITAL ENCOUNTER (EMERGENCY)
Facility: CLINIC | Age: 43
Discharge: HOME | End: 2023-09-21
Attending: FAMILY MEDICINE | Admitting: FAMILY MEDICINE
Payer: OTHER MISCELLANEOUS

## 2023-09-21 ENCOUNTER — APPOINTMENT (OUTPATIENT)
Dept: GENERAL RADIOLOGY | Facility: CLINIC | Age: 43
End: 2023-09-21
Attending: FAMILY MEDICINE
Payer: OTHER MISCELLANEOUS

## 2023-09-21 VITALS
RESPIRATION RATE: 18 BRPM | OXYGEN SATURATION: 100 % | HEART RATE: 67 BPM | SYSTOLIC BLOOD PRESSURE: 146 MMHG | TEMPERATURE: 97.7 F | WEIGHT: 198 LBS | DIASTOLIC BLOOD PRESSURE: 92 MMHG | BODY MASS INDEX: 36.44 KG/M2 | HEIGHT: 62 IN
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
## 2023-09-21 DIAGNOSIS — S19.9XXA INJURY OF NECK, INITIAL ENCOUNTER: ICD-10-CM

## 2023-09-21 PROCEDURE — 99284 EMERGENCY DEPT VISIT MOD MDM: CPT | Performed by: FAMILY MEDICINE

## 2023-09-21 PROCEDURE — 99283 EMERGENCY DEPT VISIT LOW MDM: CPT | Performed by: FAMILY MEDICINE

## 2023-09-21 PROCEDURE — 72040 X-RAY EXAM NECK SPINE 2-3 VW: CPT

## 2023-09-21 RX ORDER — CYCLOBENZAPRINE HCL 10 MG
10 TABLET ORAL 3 TIMES DAILY PRN
Qty: 20 TABLET | Refills: 0 | Status: SHIPPED | OUTPATIENT
Start: 2023-09-21 | End: 2023-09-27

## 2023-09-21 ASSESSMENT — ACTIVITIES OF DAILY LIVING (ADL)
ADLS_ACUITY_SCORE: 33
ADLS_ACUITY_SCORE: 35
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
## 2023-09-21 NOTE — DISCHARGE INSTRUCTIONS
Home.  Your xray did not show any acute fracture.  Ice to neck.  Tylenol and ibuprofen.  Flexeril for spasm and pain.  Follow up with MD for a recheck if any concerns.  Return if numbness, weakness, bowel or bladder concerns.  Can follow up with employee health also.
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
## 2023-10-09 PROBLEM — R87.610 ATYPICAL SQUAMOUS CELLS OF UNDETERMINED SIGNIFICANCE (ASCUS) ON PAPANICOLAOU SMEAR OF CERVIX: Status: ACTIVE | Noted: 2022-07-01

## 2023-10-09 PROBLEM — M75.51 ACUTE SHOULDER BURSITIS, RIGHT: Status: ACTIVE | Noted: 2018-09-04

## 2023-10-09 PROBLEM — Z98.891 HISTORY OF CESAREAN SECTION: Status: ACTIVE | Noted: 2018-03-21
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
## 2023-10-09 PROBLEM — M25.511 ACUTE PAIN OF RIGHT SHOULDER: Status: ACTIVE | Noted: 2018-09-04

## 2023-10-09 NOTE — PROGRESS NOTES
Radha is a 43 year old  female who presents for annual exam.     Besides routine health maintenance, she has no other health concerns today .    HPI:  Radha is doing well overall. She works at [employer] and [employer]. She has only had spotting once. No mammogram recently. Has the Mirena IUD for contraception and for menses management. It was placed 2018. She does not want another pregnancy as her last one was notable for a shortened cervix and  labor.      GYNECOLOGIC HISTORY:    No LMP recorded. (Menstrual status: IUD).    Regular menses? No, intermittent spotting with IUD    Her current contraception method is: IUD.  She  reports that she has never smoked. She has never used smokeless tobacco.    Patient is sexually active.  STD testing offered?  Accepted    Last PHQ-9 score on record =        No data to display              Last GAD7 score on record =        No data to display              Alcohol Score =     HEALTH MAINTENANCE:    Overdue       Never done ADVANCE CARE PLANNING (Every 5 Years)     Never done HIV SCREENING (Once)     Never done HEPATITIS C SCREENING (Once)     SEP 14   2008 HEPATITIS B IMMUNIZATION (3 of 3 - 19+ 3-dose series)  Last completed:  PHQ-2 (once per calendar year) (Yearly, January to December)  Last completed:  YEARLY PREVENTIVE VISIT (Yearly)   Last completed: 2022     SEP 1   2023 INFLUENZA VACCINE (1)  Last completed: Oct 19, 2022        Upcoming       2025 PAP (Every 3 Years)   Last completed: 2022     MAR 21   2028 DTAP/TDAP/TD IMMUNIZATION (4 - Td or Tdap)  Last completed: Mar 21, 2018     Health Maintenance reviewed: yes    HISTORY:  OB History    Para Term  AB Living   6 3 3 0 3 3   SAB IAB Ectopic Multiple Live Births   1 2 0 0 3      # Outcome Date GA Lbr Regan/2nd Weight Sex Delivery Anes PTL Lv   6 Term 18 39w2d  2.8 kg (6 lb 2.8 oz) M CS-Unspec   TU       Name: OLESYA SEXTON      Apgar1: 9  Apgar5: 8   5 Term 12 39w3d  3.265 kg (7 lb 3.2 oz) F CS-Unspec   TU      Birth Comments: System Generated. Please review and update pregnancy details.      Name: Macy      Apgar1: 4  Apgar5: 8   4 Term 07 39w4d 17:00 3.033 kg (6 lb 11 oz) F Vag-Spont   TU      Birth Comments: shoulder dystocia, nuchal cord x1      Name: Timel   3 SAB 96 12w0d       DEC      Birth Comments: no D&C   2 IAB 95        DEC   1 IAB 94        DEC      Birth Comments: ? post AB infection. see below       Patient Active Problem List   Diagnosis    Work related injury    Vitamin D deficiency    Thoracic myofascial strain    Obesity    Lumbar strain    Overexertion from lifting    History of  section    Gastroesophageal reflux disease    Atypical squamous cells of undetermined significance (ASCUS) on Papanicolaou smear of cervix    Allergic rhinitis    Acute shoulder bursitis, right    Acute pain of right shoulder    Acute bilateral thoracic back pain    Acute bilateral low back pain without sciatica     Past Surgical History:   Procedure Laterality Date     SECTION  05/01/2018    X 2    GYN SURGERY        Social History     Tobacco Use    Smoking status: Never    Smokeless tobacco: Never   Substance Use Topics    Alcohol use: No      Problem (# of Occurrences) Relation (Name,Age of Onset)    No Known Problems (1) Mother              Current Outpatient Medications   Medication Sig    acetaminophen (TYLENOL) 325 MG tablet Take 325-650 mg by mouth every 6 hours as needed for mild pain    famotidine (PEPCID) 40 MG tablet Take 1 tablet (40 mg) by mouth At Bedtime    ibuprofen (ADVIL/MOTRIN) 600 MG tablet Take 1 tablet (600 mg) by mouth every 6 hours as needed for moderate pain    levonorgestrel (MIRENA, 52 MG,) 52 MG (20 mcg/day) IUD 1 each by Intrauterine route once    vitamin B-12 (CYANOCOBALAMIN) 1000 MCG tablet Take 1,000 mcg by mouth    vitamin D3  "(CHOLECALCIFEROL) 1.25 MG (19221 UT) capsule Take 50,000 Units by mouth once a week     No current facility-administered medications for this visit.     Allergies   Allergen Reactions    Chloroquine      Taken in [place]       Past medical, surgical, social and family histories were reviewed and updated in EPIC.    ROS:   12 point review of systems negative other than symptoms noted below or in the HPI.  No urinary frequency or dysuria, bladder or kidney problems    EXAM:  /78 (BP Location: Right arm, Cuff Size: Adult Large)   Ht 1.575 m (5' 2\")   Wt 92.5 kg (204 lb)   BMI 37.31 kg/m     BMI: Body mass index is 37.31 kg/m .    PHYSICAL EXAM:  Constitutional:   Appearance: Well nourished, well developed, alert, in no acute distress  Neck:  Lymph Nodes:  No lymphadenopathy present    Thyroid:  Gland size normal, nontender, no nodules or masses present  on palpation  Chest:  Respiratory Effort:  Breathing unlabored  Cardiovascular:    Heart: Auscultation:  Regular rate, normal rhythm, no murmurs present  Breasts: Inspection of Breasts:  No lymphadenopathy present., Palpation of Breasts and Axillae:  No masses present on palpation, no breast tenderness., Axillary Lymph Nodes:  No lymphadenopathy present., and No nodularity, asymmetry or nipple discharge bilaterally.  Gastrointestinal:   Abdominal Examination:  Abdomen nontender to palpation, tone normal without rigidity or guarding, no masses present, umbilicus without lesions   Liver and Spleen:  No hepatomegaly present, liver nontender to palpation    Hernias:  No hernias present  Lymphatic: Lymph Nodes:  No other lymphadenopathy present  Skin:  General Inspection:  No rashes present, no lesions present, no areas of  discoloration  Neurologic:    Mental Status:  Oriented X3.  Normal strength and tone, sensory exam                grossly normal, mentation intact and speech normal.    Psychiatric:   Mentation appears normal and affect " normal/bright.         Pelvic Exam:  External Genitalia:     Normal appearance for age, no discharge present, no tenderness present, no inflammatory lesions present, color normal  Vagina:    Normal vaginal vault without central or paravaginal defects, no discharge present, no inflammatory lesions present, no masses present  Bladder:     Nontender to palpation  Urethra:   Urethral Body:  Urethra palpation normal, urethra structural support normal   Urethral Meatus:  No erythema or lesions present  Cervix:     Appearance healthy, no lesions present, nontender to palpation, no bleeding present, string present but very short at the 12 0'clock position also able to be palpated  Uterus:     Nontender to palpation, no masses present, position anteflexed, mobility: normal  Adnexa:     No adnexal tenderness present, no adnexal masses present  Perineum:     Perineum within normal limits, no evidence of trauma, no rashes or skin lesions present  Anus:     Anus within normal limits, no hemorrhoids present  Inguinal Lymph Nodes:     No lymphadenopathy present  Pubic Hair:     Normal pubic hair distribution for age  Genitalia and Groin:     No rashes present, no lesions present, no areas of discoloration, no masses present    COUNSELING:   Reviewed preventive health counseling, as reflected in patient instructions    BMI: Body mass index is 37.31 kg/m .    ASSESSMENT:  43 year old female with satisfactory annual exam.    ICD-10-CM    1. Encounter for gynecological examination without abnormal finding  Z01.419       2. Encounter for Papanicolaou smear for cervical cancer screening  Z12.4 Pap thin layer screen with HPV - recommended age 30 - 65 years      3. Screen for STD (sexually transmitted disease)  Z11.3 NEISSERIA GONORRHOEA PCR     CHLAMYDIA TRACHOMATIS PCR          PLAN:  Normal breast and pelvic exam. Pap smear done and IUD seen and palpated. I recommend keeping it in until 2026. Can remove sooner if she develops  intolerable bleeding on it.  GC/CT done today.  Continue to follow up with PCP    Pebbles Yañez MD

## 2023-10-11 RX ORDER — LEVONORGESTREL 52 MG/1
1 INTRAUTERINE DEVICE INTRAUTERINE ONCE
COMMUNITY
Start: 2018-07-03

## 2023-10-12 ENCOUNTER — OFFICE VISIT (OUTPATIENT)
Dept: OBGYN | Facility: CLINIC | Age: 43
End: 2023-10-12
Payer: COMMERCIAL

## 2023-10-12 VITALS
DIASTOLIC BLOOD PRESSURE: 78 MMHG | SYSTOLIC BLOOD PRESSURE: 108 MMHG | WEIGHT: 204 LBS | HEIGHT: 62 IN | BODY MASS INDEX: 37.54 KG/M2

## 2023-10-12 DIAGNOSIS — Z01.419 ENCOUNTER FOR GYNECOLOGICAL EXAMINATION WITHOUT ABNORMAL FINDING: Primary | ICD-10-CM

## 2023-10-12 DIAGNOSIS — Z12.4 ENCOUNTER FOR PAPANICOLAOU SMEAR FOR CERVICAL CANCER SCREENING: ICD-10-CM

## 2023-10-12 DIAGNOSIS — Z11.3 SCREEN FOR STD (SEXUALLY TRANSMITTED DISEASE): ICD-10-CM

## 2023-10-12 PROCEDURE — G0145 SCR C/V CYTO,THINLAYER,RESCR: HCPCS | Performed by: OBSTETRICS & GYNECOLOGY

## 2023-10-12 PROCEDURE — 87624 HPV HI-RISK TYP POOLED RSLT: CPT | Performed by: OBSTETRICS & GYNECOLOGY

## 2023-10-12 PROCEDURE — 99386 PREV VISIT NEW AGE 40-64: CPT | Performed by: OBSTETRICS & GYNECOLOGY

## 2023-10-12 PROCEDURE — 87591 N.GONORRHOEAE DNA AMP PROB: CPT | Performed by: OBSTETRICS & GYNECOLOGY

## 2023-10-12 PROCEDURE — 87491 CHLMYD TRACH DNA AMP PROBE: CPT | Performed by: OBSTETRICS & GYNECOLOGY

## 2023-10-12 ASSESSMENT — ANXIETY QUESTIONNAIRES
GAD7 TOTAL SCORE: 0
2. NOT BEING ABLE TO STOP OR CONTROL WORRYING: NOT AT ALL
IF YOU CHECKED OFF ANY PROBLEMS ON THIS QUESTIONNAIRE, HOW DIFFICULT HAVE THESE PROBLEMS MADE IT FOR YOU TO DO YOUR WORK, TAKE CARE OF THINGS AT HOME, OR GET ALONG WITH OTHER PEOPLE: NOT DIFFICULT AT ALL
3. WORRYING TOO MUCH ABOUT DIFFERENT THINGS: NOT AT ALL
6. BECOMING EASILY ANNOYED OR IRRITABLE: NOT AT ALL
1. FEELING NERVOUS, ANXIOUS, OR ON EDGE: NOT AT ALL
5. BEING SO RESTLESS THAT IT IS HARD TO SIT STILL: NOT AT ALL
7. FEELING AFRAID AS IF SOMETHING AWFUL MIGHT HAPPEN: NOT AT ALL
GAD7 TOTAL SCORE: 0

## 2023-10-12 ASSESSMENT — PATIENT HEALTH QUESTIONNAIRE - PHQ9
5. POOR APPETITE OR OVEREATING: NOT AT ALL
SUM OF ALL RESPONSES TO PHQ QUESTIONS 1-9: 1

## 2023-10-13 LAB
C TRACH DNA SPEC QL NAA+PROBE: NEGATIVE
N GONORRHOEA DNA SPEC QL NAA+PROBE: NEGATIVE

## 2023-10-16 LAB
BKR LAB AP GYN ADEQUACY: NORMAL
BKR LAB AP GYN INTERPRETATION: NORMAL
BKR LAB AP HPV REFLEX: NORMAL
BKR LAB AP PREVIOUS ABNL DX: NORMAL
BKR LAB AP PREVIOUS ABNORMAL: NORMAL
PATH REPORT.COMMENTS IMP SPEC: NORMAL
PATH REPORT.COMMENTS IMP SPEC: NORMAL
PATH REPORT.RELEVANT HX SPEC: NORMAL

## 2023-10-18 LAB
HUMAN PAPILLOMA VIRUS 16 DNA: NEGATIVE
HUMAN PAPILLOMA VIRUS 18 DNA: NEGATIVE
HUMAN PAPILLOMA VIRUS FINAL DIAGNOSIS: ABNORMAL
HUMAN PAPILLOMA VIRUS OTHER HR: POSITIVE

## 2023-10-24 ENCOUNTER — PATIENT OUTREACH (OUTPATIENT)
Dept: OBGYN | Facility: CLINIC | Age: 43
End: 2023-10-24
Payer: COMMERCIAL

## 2023-12-11 RX ORDER — MULTIVIT WITH MINERALS/LUTEIN
1000 TABLET ORAL DAILY
COMMUNITY
Start: 2023-09-15

## 2024-08-19 ENCOUNTER — OFFICE VISIT (OUTPATIENT)
Dept: URGENT CARE | Facility: URGENT CARE | Age: 44
End: 2024-08-19
Payer: COMMERCIAL

## 2024-08-19 VITALS
WEIGHT: 208 LBS | RESPIRATION RATE: 16 BRPM | DIASTOLIC BLOOD PRESSURE: 76 MMHG | HEART RATE: 85 BPM | SYSTOLIC BLOOD PRESSURE: 128 MMHG | TEMPERATURE: 97.4 F | BODY MASS INDEX: 38.04 KG/M2 | OXYGEN SATURATION: 97 %

## 2024-08-19 DIAGNOSIS — K64.5 THROMBOSED EXTERNAL HEMORRHOIDS: Primary | ICD-10-CM

## 2024-08-19 LAB
ERYTHROCYTE [DISTWIDTH] IN BLOOD BY AUTOMATED COUNT: 12.4 % (ref 10–15)
HCT VFR BLD AUTO: 39.1 % (ref 35–47)
HGB BLD-MCNC: 12.9 G/DL (ref 11.7–15.7)
MCH RBC QN AUTO: 31.8 PG (ref 26.5–33)
MCHC RBC AUTO-ENTMCNC: 33 G/DL (ref 31.5–36.5)
MCV RBC AUTO: 96 FL (ref 78–100)
PLATELET # BLD AUTO: 239 10E3/UL (ref 150–450)
RBC # BLD AUTO: 4.06 10E6/UL (ref 3.8–5.2)
WBC # BLD AUTO: 7.9 10E3/UL (ref 4–11)

## 2024-08-19 PROCEDURE — 99203 OFFICE O/P NEW LOW 30 MIN: CPT | Performed by: EMERGENCY MEDICINE

## 2024-08-19 PROCEDURE — 85027 COMPLETE CBC AUTOMATED: CPT | Performed by: EMERGENCY MEDICINE

## 2024-08-19 PROCEDURE — 36415 COLL VENOUS BLD VENIPUNCTURE: CPT | Performed by: EMERGENCY MEDICINE

## 2024-08-19 NOTE — PATIENT INSTRUCTIONS
Sitz baths (Epsom salt in bath tub) 2x daily for 15-20 minutes, Witch Hazel wipes after using the bathroom followed by a suppository (prescribed). Follow up with colorectal surgery if  not improving. ER for heavy bleeding/lightheadedness.

## 2024-08-19 NOTE — PROGRESS NOTES
Assessment & Plan     Diagnosis:    ICD-10-CM    1. Thrombosed external hemorrhoids  K64.5 CBC with platelets     Adult Colorectal Surgery  Referral     phenylephrine-cocoa butter (PREPARATION H) 0.25-88.44 % suppository     CBC with platelets        Medical Decision Making:  Radha Loya is a 44 year old female who presents for evaluation of anorectal pain and bleeding.  There has been only mild associated bleeding with this.  A broad differential was considered including fissure, hemorrhoid, mass/tumor, perirectal abscess, inflammatory bowel disease, foreign body, etc.  The workup and history indicate the bleeding is secondary to hemorrhoids. External hemorrhoid appears thrombosed; there is a small amount of clot at the surface of the hemorrhoid; no active bleeding. HGB is within normal limits.Patient is hemodynamically stable here. Patient will follow up with colorectal surgery if no improvement with conservative management. Patient verbalizes understanding and agreement with the plan including reasons to go to the ER. All questions answered.       Anson Chauhan PA-C  [facility]    Subjective     Radha Loya is a 44 year old female who presents to clinic today for the following health issues:  Chief Complaint   Patient presents with    Rectal Problem     Onset: 7 days ago: Pt has hemorrhoids that are bleeding, bleeding started yesterday 8/18/24       HPI  States that a week ago she had a bowel movement and felt that she had a hemorrhoid afterward, this is not too uncommon for her.  However states that one of them feels like it is bleeding as of yesterday, is only a small amount of blood but she has had 2 small bowel movements and they both had a little bit of blood, maroon color in it.  No active bleeding did not fill the toilet bowl/turn the water all red.  She denies any diarrhea, constipation, abdominal pain, fevers or other concerns.    Review of Systems    See HPI    Objective       Vitals: /76 (BP Location: Left arm, Patient Position: Sitting, Cuff Size: Adult Large)   Pulse 85   Temp 97.4  F (36.3  C) (Axillary)   Resp 16   Wt 94.3 kg (208 lb)   SpO2 97%   BMI 38.04 kg/m        Patient Vitals for the past 24 hrs:   BP Temp Temp src Pulse Resp SpO2 Weight   08/19/24 1355 128/76 97.4  F (36.3  C) Axillary 85 16 97 % 94.3 kg (208 lb)       Vital signs reviewed by: Anson Chauhan PA-C    Physical Exam   Constitutional: Patient is alert and cooperative. No acute distress.  Cardiovascular: Regular rate and rhythm  Pulmonary/Chest: Lungs are clear to auscultation throughout. Effort normal. No respiratory distress. No wheezes, rales or rhonchi.  GI: Abdomen is soft and non-tender throughout.  : Thrombosed external hemorrhoid in the 5 o'clock position, there is a tiny blood clot slightly protruding at the center, no active bleeding.  No rectal fissure. No purulent drainage.  Neurological: Alert and oriented x3.   Skin: No rash noted on visualized skin.  Psychiatric:The patient has a normal mood and affect.       Anson Chauhan PA-C, August 19, 2024

## 2024-08-20 ENCOUNTER — OFFICE VISIT (OUTPATIENT)
Dept: OPTOMETRY | Facility: CLINIC | Age: 44
End: 2024-08-20
Payer: COMMERCIAL

## 2024-08-20 DIAGNOSIS — H10.13 ALLERGIC CONJUNCTIVITIS, BILATERAL: Primary | ICD-10-CM

## 2024-08-20 DIAGNOSIS — H52.4 PRESBYOPIA: ICD-10-CM

## 2024-08-20 PROCEDURE — 92015 DETERMINE REFRACTIVE STATE: CPT | Performed by: OPTOMETRIST

## 2024-08-20 PROCEDURE — 92004 COMPRE OPH EXAM NEW PT 1/>: CPT | Performed by: OPTOMETRIST

## 2024-08-20 RX ORDER — OLOPATADINE HYDROCHLORIDE 1 MG/ML
1 SOLUTION/ DROPS OPHTHALMIC 2 TIMES DAILY
Qty: 5 ML | Refills: 11 | Status: SHIPPED | OUTPATIENT
Start: 2024-08-20

## 2024-08-20 RX ORDER — FLUTICASONE PROPIONATE 50 MCG
1 SPRAY, SUSPENSION (ML) NASAL DAILY
Qty: 9.9 ML | Refills: 1 | Status: SHIPPED | OUTPATIENT
Start: 2024-08-20 | End: 2025-09-20

## 2024-08-20 ASSESSMENT — REFRACTION_MANIFEST
OD_AXIS: 054
OS_CYLINDER: +1.25
METHOD_AUTOREFRACTION: 1
OD_SPHERE: -0.25
OS_ADD: +2.50
OD_ADD: +2.50
OS_SPHERE: -1.25
OD_CYLINDER: +0.25
OD_SPHERE: -0.50
OS_AXIS: 084
OS_AXIS: 085
OD_AXIS: 055
OS_SPHERE: -1.00
OS_CYLINDER: +0.75
OD_CYLINDER: +0.25

## 2024-08-20 ASSESSMENT — CONF VISUAL FIELD
OS_INFERIOR_NASAL_RESTRICTION: 0
OS_NORMAL: 1
OD_SUPERIOR_TEMPORAL_RESTRICTION: 0
OD_SUPERIOR_NASAL_RESTRICTION: 0
OD_NORMAL: 1
OS_INFERIOR_TEMPORAL_RESTRICTION: 0
OD_INFERIOR_NASAL_RESTRICTION: 0
OS_SUPERIOR_TEMPORAL_RESTRICTION: 0
OS_SUPERIOR_NASAL_RESTRICTION: 0
OD_INFERIOR_TEMPORAL_RESTRICTION: 0

## 2024-08-20 ASSESSMENT — TONOMETRY
OS_IOP_MMHG: 14
IOP_METHOD: APPLANATION
OD_IOP_MMHG: 14

## 2024-08-20 ASSESSMENT — VISUAL ACUITY
OD_SC+: -3
OD_SC: 20/50-1
OS_SC: 20/25
OS_SC+: -2
OD_SC: 20/20
OS_SC: 20/50-1
METHOD: SNELLEN - LINEAR

## 2024-08-20 ASSESSMENT — REFRACTION_WEARINGRX: SPECS_TYPE: RX READERS DIDNT BRING

## 2024-08-20 ASSESSMENT — KERATOMETRY
OD_AXISANGLE2_DEGREES: 163
OD_AXISANGLE_DEGREES: 073
OS_K1POWER_DIOPTERS: 42.00
OD_K1POWER_DIOPTERS: 42.00
OS_AXISANGLE2_DEGREES: 176
OS_K2POWER_DIOPTERS: 45.75
OD_K2POWER_DIOPTERS: 42.75
OS_AXISANGLE_DEGREES: 086

## 2024-08-20 ASSESSMENT — CUP TO DISC RATIO
OS_RATIO: 0.3
OD_RATIO: 0.3

## 2024-08-20 ASSESSMENT — EXTERNAL EXAM - LEFT EYE: OS_EXAM: NORMAL

## 2024-08-20 ASSESSMENT — EXTERNAL EXAM - RIGHT EYE: OD_EXAM: NORMAL

## 2024-08-20 ASSESSMENT — SLIT LAMP EXAM - LIDS
COMMENTS: NORMAL
COMMENTS: NORMAL

## 2024-08-20 NOTE — LETTER
8/20/2024      Radha Loya  [address]  [place] [address]      Dear Colleague,    Thank you for referring your patient, Radha Loya, to the [facility]. Please see a copy of my visit note below.    Chief Complaint   Patient presents with     Annual Eye Exam      Accompanied by children   Last Eye Exam: 7-2023  Dilated Previously: Yes    What are you currently using to see? Rx  readers       Distance Vision Acuity: Satisfied with vision    Near Vision Acuity: Satisfied with vision while reading  with  rx readers    Eye Comfort: good  Do you use eye drops? : No  Occupation or Hobbies: [employer]          Medical, surgical and family histories reviewed and updated 8/20/2024.       OBJECTIVE: See Ophthalmology exam    ASSESSMENT:    ICD-10-CM    1. Allergic conjunctivitis, bilateral  H10.13       2. Presbyopia  H52.4           PLAN:     Patient Instructions   You have allergies that are affecting your eyes.  This can cause itching and tearing of the eyes.  I recommend allergy drops to be used daily for 14 weeks.  Then you can then use it when your eyes are bothering you or if there are certain times of the year when you know you will be affected.  Cold compresses can also make things more comfortable.  Try not to rub the eyes.  I recommend that you see your primary care doctor or an allergist if you have other symptoms such as a runny nose or sneezing which has not been evaluated before or if your current medications don't seem to be helping.    Presbyopia is the diagnosis. Presbyopia is an age-related condition where the eye's crystalline lens doesn't change shape as easily as it once did.    The affects of the dilating drops last for 4- 6 hours.  You will be more sensitive to light and vision will be blurry up close.  Do not drive if you do not feel comfortable.  Mydriatic sunglasses were given if needed.    Eyeglass prescription  given.      Recommend annual eye exams.      Dariela Bishop O.D.  [address] [id number] [phone]           Again, thank you for allowing me to participate in the care of your patient.        Sincerely,        Dariela Bishop OD

## 2024-08-20 NOTE — PROGRESS NOTES
Chief Complaint   Patient presents with    Annual Eye Exam      Accompanied by children   Last Eye Exam: 7-2023  Dilated Previously: Yes    What are you currently using to see? Rx  readers       Distance Vision Acuity: Satisfied with vision    Near Vision Acuity: Satisfied with vision while reading  with  rx readers    Eye Comfort: good  Do you use eye drops? : No  Occupation or Hobbies: [employer]          Medical, surgical and family histories reviewed and updated 8/20/2024.       OBJECTIVE: See Ophthalmology exam    ASSESSMENT:    ICD-10-CM    1. Allergic conjunctivitis, bilateral  H10.13       2. Presbyopia  H52.4           PLAN:     Patient Instructions   You have allergies that are affecting your eyes.  This can cause itching and tearing of the eyes.  I recommend allergy drops to be used daily for 14 weeks.  Then you can then use it when your eyes are bothering you or if there are certain times of the year when you know you will be affected.  Cold compresses can also make things more comfortable.  Try not to rub the eyes.  I recommend that you see your primary care doctor or an allergist if you have other symptoms such as a runny nose or sneezing which has not been evaluated before or if your current medications don't seem to be helping.    Presbyopia is the diagnosis. Presbyopia is an age-related condition where the eye's crystalline lens doesn't change shape as easily as it once did.    The affects of the dilating drops last for 4- 6 hours.  You will be more sensitive to light and vision will be blurry up close.  Do not drive if you do not feel comfortable.  Mydriatic sunglasses were given if needed.    Eyeglass prescription given.      Recommend annual eye exams.      Dariela Bishop O.D.  [address] [address]    [phone]

## 2024-09-24 ENCOUNTER — PATIENT OUTREACH (OUTPATIENT)
Dept: OBGYN | Facility: CLINIC | Age: 44
End: 2024-09-24
Payer: COMMERCIAL

## 2024-09-24 PROBLEM — N87.0 DYSPLASIA OF CERVIX, LOW GRADE (CIN 1): Status: ACTIVE | Noted: 2022-07-01

## 2025-01-04 ENCOUNTER — HEALTH MAINTENANCE LETTER (OUTPATIENT)
Age: 45
End: 2025-01-04

## 2025-02-03 ENCOUNTER — IMMUNIZATION (OUTPATIENT)
Dept: FAMILY MEDICINE | Facility: CLINIC | Age: 45
End: 2025-02-03
Payer: COMMERCIAL

## 2025-02-03 DIAGNOSIS — Z23 ENCOUNTER FOR IMMUNIZATION: Primary | ICD-10-CM

## 2025-02-03 PROCEDURE — 90471 IMMUNIZATION ADMIN: CPT

## 2025-02-03 PROCEDURE — 99207 PR NO CHARGE NURSE ONLY: CPT

## 2025-02-03 PROCEDURE — 90656 IIV3 VACC NO PRSV 0.5 ML IM: CPT

## 2025-02-03 NOTE — PROGRESS NOTES
Prior to immunization administration, verified patients identity using patient s name and date of birth. Please see Immunization Activity for additional information.     Is the patient's temperature normal (100.5 or less)? Yes     Patient MEETS CRITERIA. PROCEED with vaccine administration.      Patient instructed to remain in clinic for 15 minutes afterwards, and to report any adverse reactions.      Link to Ancillary Visit Immunization Standing Orders SmartSet     Screening performed by Emilie Smith MA on 2/3/2025 at 12:57 PM.
